# Patient Record
Sex: MALE | Race: WHITE | NOT HISPANIC OR LATINO | Employment: OTHER | URBAN - METROPOLITAN AREA
[De-identification: names, ages, dates, MRNs, and addresses within clinical notes are randomized per-mention and may not be internally consistent; named-entity substitution may affect disease eponyms.]

---

## 2017-04-25 ENCOUNTER — HOSPITAL ENCOUNTER (OUTPATIENT)
Dept: LAB | Facility: MEDICAL CENTER | Age: 62
End: 2017-04-25
Attending: NURSE PRACTITIONER
Payer: COMMERCIAL

## 2017-04-25 LAB
ALBUMIN SERPL BCP-MCNC: 4.5 G/DL (ref 3.2–4.9)
ALBUMIN/GLOB SERPL: 1.6 G/DL
ALP SERPL-CCNC: 77 U/L (ref 30–99)
ALT SERPL-CCNC: 43 U/L (ref 2–50)
ANION GAP SERPL CALC-SCNC: 8 MMOL/L (ref 0–11.9)
AST SERPL-CCNC: 29 U/L (ref 12–45)
BILIRUB SERPL-MCNC: 0.4 MG/DL (ref 0.1–1.5)
BUN SERPL-MCNC: 22 MG/DL (ref 8–22)
CALCIUM SERPL-MCNC: 9.3 MG/DL (ref 8.5–10.5)
CHLORIDE SERPL-SCNC: 106 MMOL/L (ref 96–112)
CHOLEST SERPL-MCNC: 162 MG/DL (ref 100–199)
CO2 SERPL-SCNC: 27 MMOL/L (ref 20–33)
CREAT SERPL-MCNC: 0.97 MG/DL (ref 0.5–1.4)
EST. AVERAGE GLUCOSE BLD GHB EST-MCNC: 114 MG/DL
GFR SERPL CREATININE-BSD FRML MDRD: >60 ML/MIN/1.73 M 2
GLOBULIN SER CALC-MCNC: 2.8 G/DL (ref 1.9–3.5)
GLUCOSE SERPL-MCNC: 121 MG/DL (ref 65–99)
HBA1C MFR BLD: 5.6 % (ref 0–5.6)
HDLC SERPL-MCNC: 32 MG/DL
LDLC SERPL CALC-MCNC: 69 MG/DL
POTASSIUM SERPL-SCNC: 4.4 MMOL/L (ref 3.6–5.5)
PROT SERPL-MCNC: 7.3 G/DL (ref 6–8.2)
SODIUM SERPL-SCNC: 141 MMOL/L (ref 135–145)
TRIGL SERPL-MCNC: 307 MG/DL (ref 0–149)

## 2017-04-25 PROCEDURE — 36415 COLL VENOUS BLD VENIPUNCTURE: CPT

## 2017-04-25 PROCEDURE — 80053 COMPREHEN METABOLIC PANEL: CPT

## 2017-04-25 PROCEDURE — 83036 HEMOGLOBIN GLYCOSYLATED A1C: CPT

## 2017-04-25 PROCEDURE — 80061 LIPID PANEL: CPT

## 2017-05-26 ENCOUNTER — HOSPITAL ENCOUNTER (OUTPATIENT)
Dept: RADIOLOGY | Facility: MEDICAL CENTER | Age: 62
End: 2017-05-26
Attending: ORTHOPAEDIC SURGERY
Payer: COMMERCIAL

## 2017-05-26 DIAGNOSIS — M75.121 COMPLETE TEAR OF RIGHT ROTATOR CUFF: ICD-10-CM

## 2017-05-26 PROCEDURE — 73221 MRI JOINT UPR EXTREM W/O DYE: CPT | Mod: RT

## 2017-06-21 ENCOUNTER — OFFICE VISIT (OUTPATIENT)
Dept: URGENT CARE | Facility: PHYSICIAN GROUP | Age: 62
End: 2017-06-21
Payer: COMMERCIAL

## 2017-06-21 VITALS
TEMPERATURE: 97.2 F | RESPIRATION RATE: 16 BRPM | WEIGHT: 260 LBS | DIASTOLIC BLOOD PRESSURE: 78 MMHG | BODY MASS INDEX: 32.5 KG/M2 | OXYGEN SATURATION: 94 % | HEART RATE: 98 BPM | SYSTOLIC BLOOD PRESSURE: 136 MMHG

## 2017-06-21 DIAGNOSIS — J22 LRTI (LOWER RESPIRATORY TRACT INFECTION): ICD-10-CM

## 2017-06-21 PROCEDURE — 99204 OFFICE O/P NEW MOD 45 MIN: CPT | Performed by: FAMILY MEDICINE

## 2017-06-21 RX ORDER — AZITHROMYCIN 250 MG/1
TABLET, FILM COATED ORAL
Qty: 6 TAB | Refills: 0 | Status: SHIPPED | OUTPATIENT
Start: 2017-06-21 | End: 2018-07-05

## 2017-06-21 ASSESSMENT — ENCOUNTER SYMPTOMS
CHILLS: 1
SWEATS: 1
SHORTNESS OF BREATH: 1
SORE THROAT: 0
DIZZINESS: 0
VOMITING: 0
HEADACHES: 1
WHEEZING: 0
EYE PAIN: 0
RHINORRHEA: 0
MYALGIAS: 1
COUGH: 1
NAUSEA: 0
FEVER: 1

## 2017-06-21 NOTE — PROGRESS NOTES
Subjective:      Kris Mcdonald is a 61 y.o. male who presents with Cough            Cough  This is a new problem. The current episode started in the past 7 days. The problem has been gradually worsening. The problem occurs every few minutes. The cough is productive of sputum. Associated symptoms include chills, a fever, headaches, myalgias, shortness of breath and sweats. Pertinent negatives include no chest pain, nasal congestion, postnasal drip, rash, rhinorrhea, sore throat or wheezing.       Review of Systems   Constitutional: Positive for fever and chills.   HENT: Negative for postnasal drip, rhinorrhea and sore throat.    Eyes: Negative for pain.   Respiratory: Positive for cough and shortness of breath. Negative for wheezing.    Cardiovascular: Negative for chest pain.   Gastrointestinal: Negative for nausea and vomiting.   Genitourinary: Negative for hematuria.   Musculoskeletal: Positive for myalgias.   Skin: Negative for rash.   Neurological: Positive for headaches. Negative for dizziness.     PMH:  has a past medical history of Hypertension; Hypercholesteremia; and Sleep apnea.  MEDS:   Current outpatient prescriptions:   •  ATORVASTATIN CALCIUM PO, Take  by mouth., Disp: , Rfl:   •  azithromycin (ZITHROMAX) 250 MG Tab, Take 2 tablets by mouth on day one. Take one tablet by mouth the remaining days until gone, Disp: 6 Tab, Rfl: 0  •  metoprolol SR (TOPROL XL) 25 MG TB24, Take 25 mg by mouth 2 Times a Day.  , Disp: , Rfl:   •  hydrochlorothiazide (HYDRODIURIL) 25 MG TABS, Take 25 mg by mouth every day., Disp: , Rfl:   •  Ciprofloxacin HCl 0.2 % SOLN, Place 5 Drops in ear 2 times a day., Disp: 1 Each, Rfl: 0  •  hydrocodone-acetaminophen (VICODIN) 5-500 MG TABS, Take 1 Tab by mouth every four hours as needed., Disp: 10 Each, Rfl: 0  •  hydrocodone/acetaminophen (NORCO)  MG TABS, Take 1-2 Tabs by mouth every 6 hours as needed.  , Disp: , Rfl:   ALLERGIES: No Known Allergies  SURGHX:   Past  Surgical History   Procedure Laterality Date   • Shoulder arthroscopy w/ bicipital tenodesis repair  10/1/2012     Performed by Efrain Zamarripa M.D. at SURGERY [facility] [place]   • Rotator cuff repair  10/1/2012     Performed by Efrain Zamarripa M.D. at SUR[facility]ERY [facility] [place]   • Shoulder decompression  10/1/2012     Performed by Efrian Zamarripa M.D. at SUR[facility]ERY [facility] [place]     SOCHX:  reports that he has never smoked. He does not have any smokeless tobacco history on file. He reports that he does not drink alcohol.  FH: family history includes Diabetes in his mother; Heart Disease in his father and mother.      Objective:     /78 mmHg  Pulse 98  Temp(Src) 36.2 °C (97.2 °F)  Resp 16  Wt 117.935 kg (260 lb)  SpO2 94%     Physical Exam   Constitutional: He is oriented to person, place, and time. He appears well-developed and well-nourished. No distress.   HENT:   Head: Normocephalic and atraumatic.   Eyes: Conjunctivae and EOM are normal. Pupils are equal, round, and reactive to light.   Cardiovascular: Normal rate and regular rhythm.    No murmur heard.  Pulmonary/Chest: Effort normal. No respiratory distress. He has wheezes. He has no rales.   Abdominal: Soft. He exhibits no distension. There is no tenderness.   Neurological: He is alert and oriented to person, place, and time. He has normal reflexes. No sensory deficit.   Skin: Skin is warm and dry.   Psychiatric: He has a normal mood and affect.               Assessment/Plan:     1. LRTI (lower respiratory tract infection)  Differential diagnosis, natural history, supportive care, and indications for immediate follow-up discussed.   - azithromycin (ZITHROMAX) 250 MG Tab; Take 2 tablets by mouth on day one. Take one tablet by mouth the remaining days until gone  Dispense: 6 Tab; Refill: 0

## 2017-06-21 NOTE — MR AVS SNAPSHOT
Kris Mcdonald   2017 9:15 AM   Office Visit   MRN: [id number]    Department:  [facility]   Dept Phone:  [phone]    Description:  Male : 1955   Provider:  Timothy Langley M.D.           Reason for Visit     Cough congestion, headache, fever(supposed) x 1 week      Allergies as of 2017     No Known Allergies      You were diagnosed with     Cough   [786.2.ICD-9-CM]         Vital Signs     Blood Pressure Pulse Temperature Respirations Weight Oxygen Saturation    136/78 mmHg 98 36.2 °C (97.2 °F) 16 117.935 kg (260 lb) 94%    Smoking Status                   Never Smoker            Basic Information     Date Of Birth Sex Race Ethnicity Preferred Language    1955 Male White Non- English      Problem List              ICD-10-CM Priority Class Noted - Resolved    Complete rupture of rotator cuff M75.120   10/1/2012 - Present      Health Maintenance        Date Due Completion Dates    IMM DTaP/Tdap/Td Vaccine (1 - Tdap) 1973 ---    COLONOSCOPY 2004 ---    IMM ZOSTER VACCINE 2014 ---            Current Immunizations     Tetanus Vaccine 2010      Below and/or attached are the medications your provider expects you to take. Review all of your home medications and newly ordered medications with your provider and/or pharmacist. Follow medication instructions as directed by your provider and/or pharmacist. Please keep your medication list with you and share with your provider. Update the information when medications are discontinued, doses are changed, or new medications (including over-the-counter products) are added; and carry medication information at all times in the event of emergency situations     Allergies:  No Known Allergies          Medications  Valid as of: 2017 -  9:33 AM    Generic Name Brand Name Tablet Size Instructions for use    Atorvastatin Calcium   Take  by mouth.        Azithromycin (Tab) ZITHROMAX 250 MG Take 2 tablets by  mouth on day one. Take one tablet by mouth the remaining days until gone        Ciprofloxacin HCl (Solution) Ciprofloxacin HCl 0.2 % Place 5 Drops in ear 2 times a day.        HydroCHLOROthiazide (Tab) HYDRODIURIL 25 MG Take 25 mg by mouth every day.        Hydrocodone-Acetaminophen (Tab) NORCO  MG Take 1-2 Tabs by mouth every 6 hours as needed.          Hydrocodone-Acetaminophen (Tab) VICODIN 5-500 MG Take 1 Tab by mouth every four hours as needed.        Metoprolol Succinate (TABLET SR 24 HR) TOPROL XL 25 MG Take 25 mg by mouth 2 Times a Day.          .                 Medicines prescribed today were sent to:     [id number] - [place], [place] - [address]    [address] [place] [address]    Phone: [phone] Fax: [phone]    Open 24 Hours?: No      Medication refill instructions:       If your prescription bottle indicates you have medication refills left, it is not necessary to call your provider’s office. Please contact your pharmacy and they will refill your medication.    If your prescription bottle indicates you do not have any refills left, you may request refills at any time through one of the following ways: The online [employer] system (except Urgent Care), by calling your provider’s office, or by asking your pharmacy to contact your provider’s office with a refill request. Medication refills are processed only during regular business hours and may not be available until the next business day. Your provider may request additional information or to have a follow-up visit with you prior to refilling your medication.   *Please Note: Medication refills are assigned a new Rx number when refilled electronically. Your pharmacy may indicate that no refills were authorized even though a new prescription for the same medication is available at the pharmacy. Please request the medicine by name with the pharmacy before contacting your provider for a refill.        Instructions    Cough,  Adult   A cough is a reflex that helps clear your throat and airways. It can help heal the body or may be a reaction to an irritated airway. A cough may only last 2 or 3 weeks (acute) or may last more than 8 weeks (chronic).   CAUSES  Acute cough:  · Viral or bacterial infections.  Chronic cough:  · Infections.  · Allergies.  · Asthma.  · Post-nasal drip.  · Smoking.  · Heartburn or acid reflux.  · Some medicines.  · Chronic lung problems (COPD).  · Cancer.  SYMPTOMS   · Cough.  · Fever.  · Chest pain.  · Increased breathing rate.  · High-pitched whistling sound when breathing (wheezing).  · Colored mucus that you cough up (sputum).  TREATMENT   · A bacterial cough may be treated with antibiotic medicine.  · A viral cough must run its course and will not respond to antibiotics.  · Your caregiver may recommend other treatments if you have a chronic cough.  HOME CARE INSTRUCTIONS   · Only take over-the-counter or prescription medicines for pain, discomfort, or fever as directed by your caregiver. Use cough suppressants only as directed by your caregiver.  · Use a cold steam vaporizer or humidifier in your bedroom or home to help loosen secretions.  · Sleep in a semi-upright position if your cough is worse at night.  · Rest as needed.  · Stop smoking if you smoke.  SEEK IMMEDIATE MEDICAL CARE IF:   · You have pus in your sputum.  · Your cough starts to worsen.  · You cannot control your cough with suppressants and are losing sleep.  · You begin coughing up blood.  · You have difficulty breathing.  · You develop pain which is getting worse or is uncontrolled with medicine.  · You have a fever.  MAKE SURE YOU:   · Understand these instructions.  · Will watch your condition.  · Will get help right away if you are not doing well or get worse.     This information is not intended to replace advice given to you by your health care provider. Make sure you discuss any questions you have with your health care provider.      Document Released: 06/15/2012 Document Revised: 03/11/2013 Document Reviewed: 02/24/2016  [employer] Interactive Patient Education ©2016 [employer]            [employer] Access Code: Activation code not generated  Current [employer] Status: Active

## 2017-06-21 NOTE — PATIENT INSTRUCTIONS
Cough, Adult   A cough is a reflex that helps clear your throat and airways. It can help heal the body or may be a reaction to an irritated airway. A cough may only last 2 or 3 weeks (acute) or may last more than 8 weeks (chronic).   CAUSES  Acute cough:  · Viral or bacterial infections.  Chronic cough:  · Infections.  · Allergies.  · Asthma.  · Post-nasal drip.  · Smoking.  · Heartburn or acid reflux.  · Some medicines.  · Chronic lung problems (COPD).  · Cancer.  SYMPTOMS   · Cough.  · Fever.  · Chest pain.  · Increased breathing rate.  · High-pitched whistling sound when breathing (wheezing).  · Colored mucus that you cough up (sputum).  TREATMENT   · A bacterial cough may be treated with antibiotic medicine.  · A viral cough must run its course and will not respond to antibiotics.  · Your caregiver may recommend other treatments if you have a chronic cough.  HOME CARE INSTRUCTIONS   · Only take over-the-counter or prescription medicines for pain, discomfort, or fever as directed by your caregiver. Use cough suppressants only as directed by your caregiver.  · Use a cold steam vaporizer or humidifier in your bedroom or home to help loosen secretions.  · Sleep in a semi-upright position if your cough is worse at night.  · Rest as needed.  · Stop smoking if you smoke.  SEEK IMMEDIATE MEDICAL CARE IF:   · You have pus in your sputum.  · Your cough starts to worsen.  · You cannot control your cough with suppressants and are losing sleep.  · You begin coughing up blood.  · You have difficulty breathing.  · You develop pain which is getting worse or is uncontrolled with medicine.  · You have a fever.  MAKE SURE YOU:   · Understand these instructions.  · Will watch your condition.  · Will get help right away if you are not doing well or get worse.     This information is not intended to replace advice given to you by your health care provider. Make sure you discuss any questions you have with your health care provider.      Document Released: 06/15/2012 Document Revised: 03/11/2013 Document Reviewed: 02/24/2016  [employer] Interactive Patient Education ©2016 [employer]

## 2017-09-08 ENCOUNTER — HOSPITAL ENCOUNTER (OUTPATIENT)
Dept: LAB | Facility: MEDICAL CENTER | Age: 62
End: 2017-09-08
Attending: NURSE PRACTITIONER
Payer: COMMERCIAL

## 2017-09-08 LAB — GFR SERPL CREATININE-BSD FRML MDRD: >60 ML/MIN/1.73 M 2

## 2017-09-08 PROCEDURE — 36415 COLL VENOUS BLD VENIPUNCTURE: CPT

## 2017-09-08 PROCEDURE — 80053 COMPREHEN METABOLIC PANEL: CPT

## 2017-09-08 PROCEDURE — 83036 HEMOGLOBIN GLYCOSYLATED A1C: CPT

## 2017-09-08 PROCEDURE — 80061 LIPID PANEL: CPT

## 2017-09-09 LAB
ALBUMIN SERPL BCP-MCNC: 4.3 G/DL (ref 3.2–4.9)
ALBUMIN/GLOB SERPL: 1.6 G/DL
ALP SERPL-CCNC: 61 U/L (ref 30–99)
ALT SERPL-CCNC: 31 U/L (ref 2–50)
ANION GAP SERPL CALC-SCNC: 7 MMOL/L (ref 0–11.9)
AST SERPL-CCNC: 28 U/L (ref 12–45)
BILIRUB SERPL-MCNC: 0.7 MG/DL (ref 0.1–1.5)
BUN SERPL-MCNC: 18 MG/DL (ref 8–22)
CALCIUM SERPL-MCNC: 9.2 MG/DL (ref 8.5–10.5)
CHLORIDE SERPL-SCNC: 105 MMOL/L (ref 96–112)
CHOLEST SERPL-MCNC: 143 MG/DL (ref 100–199)
CO2 SERPL-SCNC: 26 MMOL/L (ref 20–33)
CREAT SERPL-MCNC: 0.92 MG/DL (ref 0.5–1.4)
EST. AVERAGE GLUCOSE BLD GHB EST-MCNC: 126 MG/DL
GLOBULIN SER CALC-MCNC: 2.7 G/DL (ref 1.9–3.5)
GLUCOSE SERPL-MCNC: 107 MG/DL (ref 65–99)
HBA1C MFR BLD: 6 % (ref 0–5.6)
HDLC SERPL-MCNC: 33 MG/DL
LDLC SERPL CALC-MCNC: 76 MG/DL
POTASSIUM SERPL-SCNC: 3.8 MMOL/L (ref 3.6–5.5)
PROT SERPL-MCNC: 7 G/DL (ref 6–8.2)
SODIUM SERPL-SCNC: 138 MMOL/L (ref 135–145)
TRIGL SERPL-MCNC: 172 MG/DL (ref 0–149)

## 2018-02-23 ENCOUNTER — HOSPITAL ENCOUNTER (OUTPATIENT)
Dept: LAB | Facility: MEDICAL CENTER | Age: 63
End: 2018-02-23
Attending: NURSE PRACTITIONER
Payer: COMMERCIAL

## 2018-02-23 LAB
ALBUMIN SERPL BCP-MCNC: 4.2 G/DL (ref 3.2–4.9)
ALBUMIN/GLOB SERPL: 1.5 G/DL
ALP SERPL-CCNC: 58 U/L (ref 30–99)
ALT SERPL-CCNC: 39 U/L (ref 2–50)
ANION GAP SERPL CALC-SCNC: 4 MMOL/L (ref 0–11.9)
AST SERPL-CCNC: 22 U/L (ref 12–45)
BILIRUB SERPL-MCNC: 0.8 MG/DL (ref 0.1–1.5)
BUN SERPL-MCNC: 24 MG/DL (ref 8–22)
CALCIUM SERPL-MCNC: 9.2 MG/DL (ref 8.5–10.5)
CHLORIDE SERPL-SCNC: 105 MMOL/L (ref 96–112)
CHOLEST SERPL-MCNC: 138 MG/DL (ref 100–199)
CO2 SERPL-SCNC: 30 MMOL/L (ref 20–33)
CREAT SERPL-MCNC: 0.97 MG/DL (ref 0.5–1.4)
GLOBULIN SER CALC-MCNC: 2.8 G/DL (ref 1.9–3.5)
GLUCOSE SERPL-MCNC: 113 MG/DL (ref 65–99)
HDLC SERPL-MCNC: 32 MG/DL
LDLC SERPL CALC-MCNC: 65 MG/DL
POTASSIUM SERPL-SCNC: 4.3 MMOL/L (ref 3.6–5.5)
PROT SERPL-MCNC: 7 G/DL (ref 6–8.2)
SODIUM SERPL-SCNC: 139 MMOL/L (ref 135–145)
TRIGL SERPL-MCNC: 207 MG/DL (ref 0–149)

## 2018-02-23 PROCEDURE — 36415 COLL VENOUS BLD VENIPUNCTURE: CPT

## 2018-02-23 PROCEDURE — 80061 LIPID PANEL: CPT

## 2018-02-23 PROCEDURE — 80053 COMPREHEN METABOLIC PANEL: CPT

## 2018-07-05 ENCOUNTER — OFFICE VISIT (OUTPATIENT)
Dept: MEDICAL GROUP | Facility: PHYSICIAN GROUP | Age: 63
End: 2018-07-05
Payer: COMMERCIAL

## 2018-07-05 VITALS
TEMPERATURE: 97.5 F | OXYGEN SATURATION: 97 % | HEIGHT: 75 IN | BODY MASS INDEX: 33.32 KG/M2 | DIASTOLIC BLOOD PRESSURE: 92 MMHG | RESPIRATION RATE: 16 BRPM | WEIGHT: 268 LBS | SYSTOLIC BLOOD PRESSURE: 140 MMHG | HEART RATE: 82 BPM

## 2018-07-05 DIAGNOSIS — Z23 NEED FOR TDAP VACCINATION: ICD-10-CM

## 2018-07-05 DIAGNOSIS — G43.919 INTRACTABLE MIGRAINE WITHOUT STATUS MIGRAINOSUS, UNSPECIFIED MIGRAINE TYPE: ICD-10-CM

## 2018-07-05 DIAGNOSIS — E78.00 HYPERCHOLESTEREMIA: ICD-10-CM

## 2018-07-05 DIAGNOSIS — R73.01 IMPAIRED FASTING GLUCOSE: ICD-10-CM

## 2018-07-05 DIAGNOSIS — I10 ESSENTIAL HYPERTENSION: ICD-10-CM

## 2018-07-05 DIAGNOSIS — M62.838 MUSCLE SPASMS OF BOTH LOWER EXTREMITIES: ICD-10-CM

## 2018-07-05 PROBLEM — G43.909 MIGRAINE: Status: ACTIVE | Noted: 2018-07-05

## 2018-07-05 PROBLEM — G47.30 SLEEP APNEA: Status: ACTIVE | Noted: 2018-07-05

## 2018-07-05 PROCEDURE — 90715 TDAP VACCINE 7 YRS/> IM: CPT | Performed by: INTERNAL MEDICINE

## 2018-07-05 PROCEDURE — 90471 IMMUNIZATION ADMIN: CPT | Performed by: INTERNAL MEDICINE

## 2018-07-05 PROCEDURE — 99204 OFFICE O/P NEW MOD 45 MIN: CPT | Mod: 25 | Performed by: INTERNAL MEDICINE

## 2018-07-05 RX ORDER — ATORVASTATIN CALCIUM 40 MG/1
40 TABLET, FILM COATED ORAL NIGHTLY
COMMUNITY
End: 2019-03-12 | Stop reason: SDUPTHER

## 2018-07-05 RX ORDER — BUTALBITAL, ACETAMINOPHEN, CAFFEINE AND CODEINE PHOSPHATE 300; 50; 40; 30 MG/1; MG/1; MG/1; MG/1
1 CAPSULE ORAL
Qty: 30 CAP | Refills: 0 | Status: SHIPPED | OUTPATIENT
Start: 2018-07-05 | End: 2018-10-03

## 2018-07-05 RX ORDER — METOPROLOL SUCCINATE 100 MG/1
100 TABLET, EXTENDED RELEASE ORAL DAILY
Qty: 90 TAB | Refills: 1 | Status: SHIPPED | OUTPATIENT
Start: 2018-07-05 | End: 2019-02-28 | Stop reason: SDUPTHER

## 2018-07-05 RX ORDER — HYDROCHLOROTHIAZIDE 12.5 MG/1
12.5 TABLET ORAL DAILY
Qty: 90 TAB | Refills: 1 | Status: SHIPPED | OUTPATIENT
Start: 2018-07-05 | End: 2019-02-07 | Stop reason: SDUPTHER

## 2018-07-05 RX ORDER — CYCLOBENZAPRINE HCL 10 MG
10 TABLET ORAL 2 TIMES DAILY PRN
Qty: 60 TAB | Refills: 0 | Status: SHIPPED | OUTPATIENT
Start: 2018-07-05 | End: 2019-03-12 | Stop reason: SDUPTHER

## 2018-07-05 RX ORDER — CYCLOBENZAPRINE HCL 10 MG
10 TABLET ORAL 3 TIMES DAILY PRN
COMMUNITY
End: 2018-07-05 | Stop reason: SDUPTHER

## 2018-07-05 RX ORDER — ATORVASTATIN CALCIUM 40 MG/1
TABLET, FILM COATED ORAL
COMMUNITY
Start: 2018-05-05 | End: 2018-07-05

## 2018-07-05 RX ORDER — METOPROLOL SUCCINATE 100 MG/1
TABLET, EXTENDED RELEASE ORAL
COMMUNITY
Start: 2018-06-27 | End: 2018-07-05 | Stop reason: SDUPTHER

## 2018-07-05 RX ORDER — HYDROCHLOROTHIAZIDE 12.5 MG/1
TABLET ORAL
COMMUNITY
Start: 2018-06-06 | End: 2018-07-05 | Stop reason: SDUPTHER

## 2018-07-05 ASSESSMENT — PATIENT HEALTH QUESTIONNAIRE - PHQ9: CLINICAL INTERPRETATION OF PHQ2 SCORE: 0

## 2018-07-05 NOTE — ASSESSMENT & PLAN NOTE
Controlled on toprol  mg and HCTZ 12.5 mg daily for some time. Denies lightheadedness and dizziness. Normally his SBP is about 15 points lower than it is today in the office.

## 2018-07-05 NOTE — LETTER
[facility]  [address] [place] [address]  Fax: [phone]   Authorization for Release/Disclosure of   Protected Health Information   Name: JUAN JOLLY : 1955 SSN: [id number]   Address: [address] [place] [address] Phone:    [phone] (home)    I authorize the entity listed below to release/disclose the PHI below to:   [facility] and Brayan Pereyra M.D.   Provider or Entity Name:  [facility]   Address   [facility], [facility], [place]            [address] [facility] [address] Phone:  [phone]      Fax:       [phone]          Reason for request: continuity of care   Information to be released:    [ X ] LAST COLONOSCOPY,  including any PATH REPORT and follow-up  [ X ] LAST FIT/COLOGUARD RESULT [  ] LAST DEXA  [  ] LAST MAMMOGRAM  [  ] LAST PAP  [  ] LAST LABS [  ] RETINA EXAM REPORT  [  ] IMMUNIZATION RECORDS  [  ] Release all info      [  ] Check here and initial the line next to each item to release ALL health information INCLUDING  _____ Care and treatment for drug and / or alcohol abuse  _____ HIV testing, infection status, or AIDS  _____ Genetic Testing    DATES OF SERVICE OR TIME PERIOD TO BE DISCLOSED: _____________  I understand and acknowledge that:  * This Authorization may be revoked at any time by you in writing, except if your health information has already been used or disclosed.  * Your health information that will be used or disclosed as a result of you signing this authorization could be re-disclosed by the recipient. If this occurs, your re-disclosed health information may no longer be protected by State or Federal laws.  * You may refuse to sign this Authorization. Your refusal will not affect your ability to obtain treatment.  * This Authorization becomes effective upon signing and will  on (date) __________.      If no date is indicated, this Authorization will  one (1) year from the  signature date.    Name: Kris Mcdonald       Date:     7/5/2018       PLEASE FAX REQUESTED RECORDS BACK TO: [phone]

## 2018-07-05 NOTE — PROGRESS NOTES
PRIMARY CARE CLINIC NEW PATIENT H&P  Chief Complaint   Patient presents with   • Migraine     Med Management    • Hyperlipidemia     Med Management      History of Present Illness     Hypertension  Controlled on toprol  mg and HCTZ 12.5 mg daily for some time. Denies lightheadedness and dizziness. Normally his SBP is about 15 points lower than it is today in the office.     Impaired fasting glucose  Fasting sugar was 113 as of 2/2018 and A1c was 6% as of fall 2017. He does know that he needs to work on his diet.     Sleep apnea  Compliant with CPAP on occasion.     Migraine  Has migraines a couple times a month or if he's stressed at work. He takes Fioricet about 6 times a month. He medicates as soon as he feels something coming on even if it may be a tension headache. His migraines are characterized by photophobia and phonophobia.     Muscle spasms of both lower extremities  Works as a  and uses flexeril occasionally for upper extremity cramps.     Current Outpatient Prescriptions   Medication Sig Dispense Refill   • BUTALBITAL-ASA-CAFF-CODEINE PO Take 30 mg by mouth.     • aspirin EC (ECOTRIN) 81 MG Tablet Delayed Response Take 81 mg by mouth every day.     • atorvastatin (LIPITOR) 40 MG Tab Take 40 mg by mouth every evening.     • Multiple Vitamins-Minerals (MULTIVITAMIN ADULT PO) Take  by mouth.     • hydroCHLOROthiazide (HYDRODIURIL) 12.5 MG tablet Take 1 Tab by mouth every day. 90 Tab 1   • metoprolol SR (TOPROL XL) 100 MG TABLET SR 24 HR Take 1 Tab by mouth every day. 90 Tab 1   • cyclobenzaprine (FLEXERIL) 10 MG Tab Take 1 Tab by mouth 2 times a day as needed. 60 Tab 0   • Butalbital-APAP-Caff-Cod -48-30 MG Cap Take 1 Capsule by mouth 1 time daily as needed for up to 90 days. 30 Cap 0     No current facility-administered medications for this visit.        Past Medical History:   Diagnosis Date   • Hypercholesteremia    • Hypertension    • Impaired fasting glucose 7/5/2018   •  "Sleep apnea     uses cpap     Past Surgical History:   Procedure Laterality Date   • SHOULDER ARTHROSCOPY W/ BICIPITAL TENODESIS REPAIR  10/1/2012    Performed by Efrain Zamarripa M.D. at SURGERY [facility]   • ROTATOR CUFF REPAIR  10/1/2012    Performed by Efrain Zamarripa M.D. at SUR[facility]ERY [facility]   • SHOULDER DECOMPRESSION  10/1/2012    Performed by Efrain Zamarripa M.D. at SURGERY [facility]     Social History   Substance Use Topics   • Smoking status: Never Smoker   • Smokeless tobacco: Never Used   • Alcohol use Yes      Comment: 6 pack a week      Social History     Social History Narrative          Family History   Problem Relation Age of Onset   • Heart Disease Mother    • Diabetes Mother    • Heart Disease Father      pacemaker     Family Status   Relation Status   • Mother Alive   • Father      Allergies: Patient has no known allergies.    ROS  Constitutional: Negative for fatigue/generalized weakness.   HEENT: Negative for  vision changes, hearing changes    Respiratory: Negative for shortness of breath  Cardiovascular: Negative for chest pain, palpitations  Gastrointestinal: Negative for blood in stool, constipation, diarrhea  Genitourinary: Negative for dysuria, polyuria  Musculoskeletal: Negative for myalgias, back pain, and joint pain.   Skin: Negative for rash  Neurological: Negative for numbness, tingling  Psychiatric/Behavioral: Negative for depression, anxiety       Objective   Blood pressure 140/92, pulse 82, temperature 36.4 °C (97.5 °F), resp. rate 16, height 1.905 m (6' 3\"), weight 121.6 kg (268 lb), SpO2 97 %. Body mass index is 33.5 kg/m².    General: Alert, oriented. In no acute distress   HEET: EOMI, PERRL, conjunctiva non-injected, sclera non-icteric.  Nares patent with no significant congestion or drainage.  Emmie pinnae, external auditory canals, TM pearly gray with normal light reflex bilaterally.Oral mucous membranes pink and moist with " no lesions.  Neck: supple with no cervical, subclavicular lymphadenopathy, JVD, palpable thyroid nodules   Lungs: clear to auscultation bilaterally with good excursion.  CV: regular rate and rhythm.  Abdomen soft, non-distended, non-tender with normal bowel sounds. No hepatosplenomegaly, no masses palpated  Skin: no lesions. Warm, dry   Psychiatric: appropriate mood and affect       Assessment and Plan   The following treatment plan was discussed     1. Hypercholesteremia  Lab Results   Component Value Date/Time    CHOLSTRLTOT 138 02/23/2018 08:32 AM    LDL 65 02/23/2018 08:32 AM    HDL 32 (A) 02/23/2018 08:32 AM    TRIGLYCERIDE 207 (H) 02/23/2018 08:32 AM     Continue atorvastatin, discussed dietary modifications.     2. Impaired fasting glucose  Fasting sugar was 113 as of 2/2018, discussed dietary modifications as above.     3. Need for Tdap vaccination  Given today.   - TDAP VACCINE =>6YO IM    4. Muscle spasms of both lower extremities  - cyclobenzaprine (FLEXERIL) 10 MG Tab; Take 1 Tab by mouth 2 times a day as needed.  Dispense: 60 Tab; Refill: 0    5. Intractable migraine without status migrainosus, unspecified migraine type  Discussed judicious use and given prescription for 3 months and explained that refills of a controlled substance require 3 month follow ups. Opiate risk score 0.   - Butalbital-APAP-Caff-Cod -09-30 MG Cap; Take 1 Capsule by mouth 1 time daily as needed for up to 90 days.  Dispense: 30 Cap; Refill: 0  - CONSENT FOR OPIATE PRESCRIPTION    6. Essential hypertension  Reports SBP 15 points lower at home, which would put his home blood pressures at goal. Refills provided.   - hydroCHLOROthiazide (HYDRODIURIL) 12.5 MG tablet; Take 1 Tab by mouth every day.  Dispense: 90 Tab; Refill: 1  - metoprolol SR (TOPROL XL) 100 MG TABLET SR 24 HR; Take 1 Tab by mouth every day.  Dispense: 90 Tab; Refill: 1      Return in about 6 months (around 1/5/2019).    Health Maintenance      Health  Maintenance Due   Topic Date Due   • COLONOSCOPY  07/07/2004     Colonoscopy records requested     Brayan Pereyra MD  Internal Medicine  [facility]

## 2018-07-05 NOTE — ASSESSMENT & PLAN NOTE
Fasting sugar was 113 as of 2/2018 and A1c was 6% as of fall 2017. He does know that he needs to work on his diet.

## 2018-07-05 NOTE — ASSESSMENT & PLAN NOTE
Has migraines a couple times a month or if he's stressed at work. He takes Fioricet about 6 times a month. He medicates as soon as he feels something coming on even if it may be a tension headache. His migraines are characterized by photophobia and phonophobia.

## 2018-12-12 DIAGNOSIS — Z79.899 CONTROLLED SUBSTANCE AGREEMENT SIGNED: ICD-10-CM

## 2018-12-12 DIAGNOSIS — G43.909 MIGRAINE WITHOUT STATUS MIGRAINOSUS, NOT INTRACTABLE, UNSPECIFIED MIGRAINE TYPE: ICD-10-CM

## 2018-12-12 RX ORDER — BUTALBITAL, ACETAMINOPHEN, CAFFEINE AND CODEINE PHOSPHATE 50; 325; 40; 30 MG/1; MG/1; MG/1; MG/1
1 CAPSULE ORAL
Qty: 30 CAP | Refills: 0
Start: 2018-12-12 | End: 2019-01-11

## 2018-12-12 NOTE — TELEPHONE ENCOUNTER
Please let him know that he does need to complete the controlled substance policy contract on this medication since this is his first prescription through this office. Once signed we can given the refill

## 2018-12-13 NOTE — TELEPHONE ENCOUNTER
Phone Number Called: [phone]    Message:Pt. Stated he will be in to sign controlled substance contract on 12/13/18    Left Message for patient to call back: no

## 2018-12-14 RX ORDER — BUTALBITAL, ACETAMINOPHEN, CAFFEINE AND CODEINE PHOSPHATE 300; 50; 40; 30 MG/1; MG/1; MG/1; MG/1
1 CAPSULE ORAL EVERY 6 HOURS PRN
Qty: 30 CAP | Refills: 0 | Status: SHIPPED | OUTPATIENT
Start: 2018-12-14 | End: 2019-01-13

## 2018-12-14 NOTE — TELEPHONE ENCOUNTER
Renewed medication with new controlled substance agreement.  May consider Fioricet without codeine or triptan.

## 2019-02-07 ENCOUNTER — TELEPHONE (OUTPATIENT)
Dept: MEDICAL GROUP | Facility: PHYSICIAN GROUP | Age: 64
End: 2019-02-07

## 2019-02-07 DIAGNOSIS — E78.00 HYPERCHOLESTEREMIA: ICD-10-CM

## 2019-02-07 DIAGNOSIS — I10 ESSENTIAL HYPERTENSION: ICD-10-CM

## 2019-02-07 DIAGNOSIS — R73.01 IMPAIRED FASTING GLUCOSE: ICD-10-CM

## 2019-02-08 NOTE — TELEPHONE ENCOUNTER
Was the patient seen in the last year in this department? Yes    Does patient have an active prescription for medications requested? No     Received Request Via: Pharmacy      Pt met protocol?: Yes    OV 7/18     BP Readings from Last 1 Encounters:   07/05/18 140/92

## 2019-02-09 RX ORDER — HYDROCHLOROTHIAZIDE 12.5 MG/1
12.5 TABLET ORAL DAILY
Qty: 90 TAB | Refills: 0 | Status: SHIPPED | OUTPATIENT
Start: 2019-02-09 | End: 2019-03-12 | Stop reason: SDUPTHER

## 2019-02-11 ENCOUNTER — TELEPHONE (OUTPATIENT)
Dept: MEDICAL GROUP | Facility: PHYSICIAN GROUP | Age: 64
End: 2019-02-11

## 2019-02-11 NOTE — TELEPHONE ENCOUNTER
Let pt know he was due for appt and labs were in chart. Pt will call back when he knows work schedule.

## 2019-03-07 ENCOUNTER — HOSPITAL ENCOUNTER (OUTPATIENT)
Dept: LAB | Facility: MEDICAL CENTER | Age: 64
End: 2019-03-07
Attending: INTERNAL MEDICINE
Payer: COMMERCIAL

## 2019-03-07 DIAGNOSIS — E78.00 HYPERCHOLESTEREMIA: ICD-10-CM

## 2019-03-07 DIAGNOSIS — R73.01 IMPAIRED FASTING GLUCOSE: ICD-10-CM

## 2019-03-07 DIAGNOSIS — I10 ESSENTIAL HYPERTENSION: ICD-10-CM

## 2019-03-07 LAB
ALBUMIN SERPL BCP-MCNC: 4.4 G/DL (ref 3.2–4.9)
ALBUMIN/GLOB SERPL: 1.8 G/DL
ALP SERPL-CCNC: 67 U/L (ref 30–99)
ALT SERPL-CCNC: 35 U/L (ref 2–50)
ANION GAP SERPL CALC-SCNC: 7 MMOL/L (ref 0–11.9)
AST SERPL-CCNC: 31 U/L (ref 12–45)
BASOPHILS # BLD AUTO: 0.9 % (ref 0–1.8)
BASOPHILS # BLD: 0.06 K/UL (ref 0–0.12)
BILIRUB SERPL-MCNC: 0.8 MG/DL (ref 0.1–1.5)
BUN SERPL-MCNC: 20 MG/DL (ref 8–22)
CALCIUM SERPL-MCNC: 9.3 MG/DL (ref 8.5–10.5)
CHLORIDE SERPL-SCNC: 105 MMOL/L (ref 96–112)
CHOLEST SERPL-MCNC: 154 MG/DL (ref 100–199)
CO2 SERPL-SCNC: 27 MMOL/L (ref 20–33)
CREAT SERPL-MCNC: 0.85 MG/DL (ref 0.5–1.4)
EOSINOPHIL # BLD AUTO: 0.32 K/UL (ref 0–0.51)
EOSINOPHIL NFR BLD: 4.8 % (ref 0–6.9)
ERYTHROCYTE [DISTWIDTH] IN BLOOD BY AUTOMATED COUNT: 43.4 FL (ref 35.9–50)
EST. AVERAGE GLUCOSE BLD GHB EST-MCNC: 120 MG/DL
FASTING STATUS PATIENT QL REPORTED: NORMAL
GLOBULIN SER CALC-MCNC: 2.4 G/DL (ref 1.9–3.5)
GLUCOSE SERPL-MCNC: 118 MG/DL (ref 65–99)
HBA1C MFR BLD: 5.8 % (ref 0–5.6)
HCT VFR BLD AUTO: 49.3 % (ref 42–52)
HDLC SERPL-MCNC: 31 MG/DL
HGB BLD-MCNC: 16.5 G/DL (ref 14–18)
IMM GRANULOCYTES # BLD AUTO: 0.01 K/UL (ref 0–0.11)
IMM GRANULOCYTES NFR BLD AUTO: 0.2 % (ref 0–0.9)
LDLC SERPL CALC-MCNC: 58 MG/DL
LYMPHOCYTES # BLD AUTO: 2.46 K/UL (ref 1–4.8)
LYMPHOCYTES NFR BLD: 37.2 % (ref 22–41)
MCH RBC QN AUTO: 31.9 PG (ref 27–33)
MCHC RBC AUTO-ENTMCNC: 33.5 G/DL (ref 33.7–35.3)
MCV RBC AUTO: 95.2 FL (ref 81.4–97.8)
MONOCYTES # BLD AUTO: 0.62 K/UL (ref 0–0.85)
MONOCYTES NFR BLD AUTO: 9.4 % (ref 0–13.4)
NEUTROPHILS # BLD AUTO: 3.15 K/UL (ref 1.82–7.42)
NEUTROPHILS NFR BLD: 47.5 % (ref 44–72)
NRBC # BLD AUTO: 0 K/UL
NRBC BLD-RTO: 0 /100 WBC
PLATELET # BLD AUTO: 194 K/UL (ref 164–446)
PMV BLD AUTO: 10.1 FL (ref 9–12.9)
POTASSIUM SERPL-SCNC: 4 MMOL/L (ref 3.6–5.5)
PROT SERPL-MCNC: 6.8 G/DL (ref 6–8.2)
RBC # BLD AUTO: 5.18 M/UL (ref 4.7–6.1)
SODIUM SERPL-SCNC: 139 MMOL/L (ref 135–145)
TRIGL SERPL-MCNC: 323 MG/DL (ref 0–149)
WBC # BLD AUTO: 6.6 K/UL (ref 4.8–10.8)

## 2019-03-07 PROCEDURE — 36415 COLL VENOUS BLD VENIPUNCTURE: CPT

## 2019-03-07 PROCEDURE — 83036 HEMOGLOBIN GLYCOSYLATED A1C: CPT

## 2019-03-07 PROCEDURE — 85025 COMPLETE CBC W/AUTO DIFF WBC: CPT

## 2019-03-07 PROCEDURE — 80053 COMPREHEN METABOLIC PANEL: CPT

## 2019-03-07 PROCEDURE — 80061 LIPID PANEL: CPT

## 2019-03-12 ENCOUNTER — OFFICE VISIT (OUTPATIENT)
Dept: MEDICAL GROUP | Facility: PHYSICIAN GROUP | Age: 64
End: 2019-03-12
Payer: COMMERCIAL

## 2019-03-12 VITALS
RESPIRATION RATE: 16 BRPM | SYSTOLIC BLOOD PRESSURE: 132 MMHG | HEART RATE: 78 BPM | HEIGHT: 75 IN | WEIGHT: 272 LBS | OXYGEN SATURATION: 98 % | DIASTOLIC BLOOD PRESSURE: 80 MMHG | TEMPERATURE: 97.8 F | BODY MASS INDEX: 33.82 KG/M2

## 2019-03-12 DIAGNOSIS — R04.0 EPISTAXIS: ICD-10-CM

## 2019-03-12 DIAGNOSIS — G43.919 INTRACTABLE MIGRAINE WITHOUT STATUS MIGRAINOSUS, UNSPECIFIED MIGRAINE TYPE: ICD-10-CM

## 2019-03-12 DIAGNOSIS — M62.838 MUSCLE SPASMS OF BOTH LOWER EXTREMITIES: ICD-10-CM

## 2019-03-12 DIAGNOSIS — I10 ESSENTIAL HYPERTENSION: ICD-10-CM

## 2019-03-12 DIAGNOSIS — E78.00 HYPERCHOLESTEREMIA: ICD-10-CM

## 2019-03-12 PROCEDURE — 99213 OFFICE O/P EST LOW 20 MIN: CPT | Performed by: INTERNAL MEDICINE

## 2019-03-12 RX ORDER — CODEINE/BUTALBITAL/ASA/CAFFEIN 30-50-325
1 CAPSULE ORAL
Qty: 90 CAP | Refills: 0 | Status: SHIPPED | OUTPATIENT
Start: 2019-03-12 | End: 2020-07-15 | Stop reason: SDUPTHER

## 2019-03-12 RX ORDER — HYDROCHLOROTHIAZIDE 12.5 MG/1
12.5 TABLET ORAL DAILY
Qty: 90 TAB | Refills: 3 | Status: SHIPPED | OUTPATIENT
Start: 2019-03-12 | End: 2020-04-14

## 2019-03-12 RX ORDER — CYCLOBENZAPRINE HCL 10 MG
10 TABLET ORAL 2 TIMES DAILY PRN
Qty: 90 TAB | Refills: 1 | Status: SHIPPED | OUTPATIENT
Start: 2019-03-12 | End: 2020-07-15 | Stop reason: SDUPTHER

## 2019-03-12 RX ORDER — ATORVASTATIN CALCIUM 40 MG/1
40 TABLET, FILM COATED ORAL DAILY
Qty: 90 TAB | Refills: 3 | Status: SHIPPED | OUTPATIENT
Start: 2019-03-12 | End: 2020-02-25

## 2019-03-12 RX ORDER — METOPROLOL SUCCINATE 100 MG/1
100 TABLET, EXTENDED RELEASE ORAL DAILY
Qty: 90 TAB | Refills: 3 | Status: SHIPPED | OUTPATIENT
Start: 2019-03-12 | End: 2020-05-05

## 2019-03-12 ASSESSMENT — PATIENT HEALTH QUESTIONNAIRE - PHQ9: CLINICAL INTERPRETATION OF PHQ2 SCORE: 0

## 2019-03-13 NOTE — ASSESSMENT & PLAN NOTE
He has been having some intermittent episodes of epistaxis for the past few weeks of his left nare. He used a q-tip with petroleum jelly which seemed to help.

## 2019-03-13 NOTE — PROGRESS NOTES
PRIMARY CARE CLINIC FOLLOW UP VISIT  Chief Complaint   Patient presents with   • Epistaxis     History of Present Illness     Epistaxis  He has been having some intermittent episodes of epistaxis for the past few weeks of his left nare. He used a q-tip with petroleum jelly which seemed to help.     Current Outpatient Prescriptions   Medication Sig Dispense Refill   • atorvastatin (LIPITOR) 40 MG Tab Take 1 Tab by mouth every day. 90 Tab 3   • metoprolol SR (TOPROL XL) 100 MG TABLET SR 24 HR Take 1 Tab by mouth every day. 90 Tab 3   • hydroCHLOROthiazide (HYDRODIURIL) 12.5 MG tablet Take 1 Tab by mouth every day. 90 Tab 3   • butalbital-aspirin-caffeine-codeine (FIORINAL WITH CODEINE) -37-30 MG per capsule Take 1 Cap by mouth 1 time daily as needed for up to 90 days. 90 Cap 0   • cyclobenzaprine (FLEXERIL) 10 MG Tab Take 1 Tab by mouth 2 times a day as needed. 90 Tab 1   • aspirin EC (ECOTRIN) 81 MG Tablet Delayed Response Take 81 mg by mouth every day.     • Multiple Vitamins-Minerals (MULTIVITAMIN ADULT PO) Take  by mouth.       No current facility-administered medications for this visit.      Past Medical History:   Diagnosis Date   • Hypercholesteremia    • Hypertension    • Impaired fasting glucose 7/5/2018   • Sleep apnea     uses cpap     Past Surgical History:   Procedure Laterality Date   • SHOULDER ARTHROSCOPY W/ BICIPITAL TENODESIS REPAIR  10/1/2012    Performed by Efrain Zamarripa M.D. at SURGERY [facility]   • ROTATOR CUFF REPAIR  10/1/2012    Performed by Efrain Zamarripa M.D. at SURGERY [facility]   • SHOULDER DECOMPRESSION  10/1/2012    Performed by Efrain Zamarripa M.D. at SURGERY [facility]     Social History   Substance Use Topics   • Smoking status: Never Smoker   • Smokeless tobacco: Never Used   • Alcohol use Yes      Comment: 6 pack a week      Social History     Social History Narrative          Family History   Problem Relation Age of Onset   • Heart  "Disease Mother    • Diabetes Mother    • Heart Disease Father         pacemaker     Family Status   Relation Status   • Mo Alive   • Fa      Allergies: Patient has no known allergies.    ROS  As per HPI above. All other systems reviewed and negative.        Objective   Blood pressure 132/80, pulse 78, temperature 36.6 °C (97.8 °F), resp. rate 16, height 1.905 m (6' 3\"), weight 123.4 kg (272 lb), SpO2 98 %. Body mass index is 34 kg/m².    General: alert and oriented, pleasant, cooperative  HEENT: Normocephalic, atraumatic. Erythema of medial aspect of septum visualized through left nare but no active oozing   Cardiovascular: regular rate and rhythm, normal S1/S2  Pulmonary: lungs clear to auscultation bilaterally  Skin: warm and dry, no lesions or rashes  Psychiatric: appropriate mood and affect. Good insight and appropriate judgment       Assessment and Plan   The following treatment plan was discussed     1. Hypercholesteremia  Given refill of his atorvastatin.     2. Essential hypertension  - metoprolol SR (TOPROL XL) 100 MG TABLET SR 24 HR; Take 1 Tab by mouth every day.  Dispense: 90 Tab; Refill: 3    3. Muscle spasms of both lower extremities  - cyclobenzaprine (FLEXERIL) 10 MG Tab; Take 1 Tab by mouth 2 times a day as needed.  Dispense: 90 Tab; Refill: 1    4. Intractable migraine without status migrainosus, unspecified migraine type  - butalbital-aspirin-caffeine-codeine (FIORINAL WITH CODEINE) -80-30 MG per capsule; Take 1 Cap by mouth 1 time daily as needed for up to 90 days.  Dispense: 90 Cap; Refill: 0  - Consent for Opiate Prescription    5. Epistaxis  No active oozing for cautery today. Advised continuing petroleum jelly, may use humidifier and nasal saline spray to help keep area moisturized as it's likely bleeding from dryness.       Healthcare maintenance     Health Maintenance Due   Topic Date Due   • IMM INFLUENZA (1) 2018       Return in about 1 year (around 3/12/2020).    Brayan " Hafsa MOORE  Internal Medicine  [facility]

## 2020-04-13 NOTE — TELEPHONE ENCOUNTER
Was the patient seen in the last year in this department? No     Does patient have an active prescription for medications requested? No     Received Request Via: Pharmacy      Pt met protocol?: No   Pt last ov 3/2019 has no upcoming appt   BP Readings from Last 1 Encounters:   03/12/19 132/80

## 2020-04-14 ENCOUNTER — PATIENT MESSAGE (OUTPATIENT)
Dept: MEDICAL GROUP | Facility: PHYSICIAN GROUP | Age: 65
End: 2020-04-14

## 2020-04-14 RX ORDER — HYDROCHLOROTHIAZIDE 12.5 MG/1
TABLET ORAL
Qty: 90 TAB | Refills: 0 | Status: SHIPPED | OUTPATIENT
Start: 2020-04-14 | End: 2021-08-26

## 2020-05-03 DIAGNOSIS — I10 ESSENTIAL HYPERTENSION: ICD-10-CM

## 2020-05-05 RX ORDER — METOPROLOL SUCCINATE 100 MG/1
TABLET, EXTENDED RELEASE ORAL
Qty: 90 TAB | Refills: 0 | Status: SHIPPED | OUTPATIENT
Start: 2020-05-05 | End: 2020-10-26 | Stop reason: SDUPTHER

## 2020-05-05 NOTE — TELEPHONE ENCOUNTER
*PT NEEDS TO ESTABLISH WITH NEW PCP AND MAKE AN APPT AND GET LABS DONE*  Was the patient seen in the last year in this department? No     Does patient have an active prescription for medications requested? No     Received Request Via: Pharmacy      Pt met protocol?: NO    LAST OV 03/12/2019    BP Readings from Last 1 Encounters:   03/12/19 132/80     Lab Results   Component Value Date/Time    CHOLSTRLTOT 154 03/07/2019 07:21 AM    LDL 58 03/07/2019 07:21 AM    HDL 31 (A) 03/07/2019 07:21 AM    TRIGLYCERIDE 323 (H) 03/07/2019 07:21 AM       Lab Results   Component Value Date/Time    SODIUM 139 03/07/2019 07:21 AM    POTASSIUM 4.0 03/07/2019 07:21 AM    CHLORIDE 105 03/07/2019 07:21 AM    CO2 27 03/07/2019 07:21 AM    GLUCOSE 118 (H) 03/07/2019 07:21 AM    BUN 20 03/07/2019 07:21 AM    CREATININE 0.85 03/07/2019 07:21 AM     Lab Results   Component Value Date/Time    ALKPHOSPHAT 67 03/07/2019 07:21 AM    ASTSGOT 31 03/07/2019 07:21 AM    ALTSGPT 35 03/07/2019 07:21 AM    TBILIRUBIN 0.8 03/07/2019 07:21 AM

## 2020-05-27 RX ORDER — ATORVASTATIN CALCIUM 40 MG/1
40 TABLET, FILM COATED ORAL DAILY
Qty: 90 TAB | Refills: 0 | Status: SHIPPED | OUTPATIENT
Start: 2020-05-27 | End: 2020-08-25

## 2020-07-15 ENCOUNTER — OFFICE VISIT (OUTPATIENT)
Dept: MEDICAL GROUP | Facility: PHYSICIAN GROUP | Age: 65
End: 2020-07-15
Payer: COMMERCIAL

## 2020-07-15 VITALS
OXYGEN SATURATION: 94 % | DIASTOLIC BLOOD PRESSURE: 70 MMHG | HEIGHT: 75 IN | RESPIRATION RATE: 18 BRPM | BODY MASS INDEX: 28.1 KG/M2 | TEMPERATURE: 97.6 F | HEART RATE: 71 BPM | WEIGHT: 226 LBS | SYSTOLIC BLOOD PRESSURE: 124 MMHG

## 2020-07-15 DIAGNOSIS — E78.00 HYPERCHOLESTEREMIA: ICD-10-CM

## 2020-07-15 DIAGNOSIS — G43.919 INTRACTABLE MIGRAINE WITHOUT STATUS MIGRAINOSUS, UNSPECIFIED MIGRAINE TYPE: ICD-10-CM

## 2020-07-15 DIAGNOSIS — M62.838 MUSCLE SPASMS OF BOTH LOWER EXTREMITIES: ICD-10-CM

## 2020-07-15 DIAGNOSIS — I10 ESSENTIAL HYPERTENSION: ICD-10-CM

## 2020-07-15 DIAGNOSIS — Z76.89 ENCOUNTER TO ESTABLISH CARE: ICD-10-CM

## 2020-07-15 DIAGNOSIS — Z11.59 NEED FOR HEPATITIS C SCREENING TEST: ICD-10-CM

## 2020-07-15 DIAGNOSIS — G47.30 SLEEP APNEA, UNSPECIFIED TYPE: ICD-10-CM

## 2020-07-15 DIAGNOSIS — Z23 NEED FOR VACCINATION: ICD-10-CM

## 2020-07-15 PROCEDURE — 99214 OFFICE O/P EST MOD 30 MIN: CPT | Mod: 25 | Performed by: NURSE PRACTITIONER

## 2020-07-15 PROCEDURE — 90471 IMMUNIZATION ADMIN: CPT | Performed by: NURSE PRACTITIONER

## 2020-07-15 PROCEDURE — 90670 PCV13 VACCINE IM: CPT | Performed by: NURSE PRACTITIONER

## 2020-07-15 RX ORDER — CYCLOBENZAPRINE HCL 10 MG
10 TABLET ORAL 2 TIMES DAILY PRN
Qty: 90 TAB | Refills: 1 | Status: SHIPPED | OUTPATIENT
Start: 2020-07-15 | End: 2021-08-26 | Stop reason: SDUPTHER

## 2020-07-15 RX ORDER — CODEINE/BUTALBITAL/ASA/CAFFEIN 30-50-325
1 CAPSULE ORAL
Qty: 90 CAP | Refills: 0 | Status: SHIPPED | OUTPATIENT
Start: 2020-07-15 | End: 2020-10-26

## 2020-07-15 SDOH — HEALTH STABILITY: MENTAL HEALTH: HOW OFTEN DO YOU HAVE A DRINK CONTAINING ALCOHOL?: 2-3 TIMES A WEEK

## 2020-07-15 SDOH — HEALTH STABILITY: MENTAL HEALTH: HOW MANY STANDARD DRINKS CONTAINING ALCOHOL DO YOU HAVE ON A TYPICAL DAY?: 1 OR 2

## 2020-07-15 ASSESSMENT — PATIENT HEALTH QUESTIONNAIRE - PHQ9: CLINICAL INTERPRETATION OF PHQ2 SCORE: 0

## 2020-07-15 ASSESSMENT — FIBROSIS 4 INDEX: FIB4 SCORE: 1.78

## 2020-07-15 NOTE — ASSESSMENT & PLAN NOTE
Chronic medical problem. He is taking Fiornal. He takes about once a month. He would like a medication refill.

## 2020-07-15 NOTE — ASSESSMENT & PLAN NOTE
Chronic medical problem. He is taking HCTZ 12.5 mg daily and metoprolol  mg daily. He does not check his blood pressure at home. Denies chest pain, shortness of breath, palpations, dizziness or headaches. His BP is at goal today.

## 2020-07-15 NOTE — ASSESSMENT & PLAN NOTE
Chronic medical problem. He is taking cyclobenzaprine 10 mg BID PRN. He will take every 2 weeks as needed. He would like a medication refill.

## 2020-07-15 NOTE — ASSESSMENT & PLAN NOTE
Chronic medical problem. He did wear a CPAP in past but stopped. He does wake up rested in the morning.

## 2020-07-15 NOTE — ASSESSMENT & PLAN NOTE
Chronic medical problem. He is taking atorvastatin 40 mg every evening. Denies myalgias. Last lab results:  Results for JUAN JOLLY (MRN [id number]) as of 7/15/2020 08:24   Ref. Range 3/7/2019 07:21   Cholesterol,Tot Latest Ref Range: 100 - 199 mg/dL 154   Triglycerides Latest Ref Range: 0 - 149 mg/dL 323 (H)   HDL Latest Ref Range: >=40 mg/dL 31 (A)   LDL Latest Ref Range: <100 mg/dL 58

## 2020-07-15 NOTE — PROGRESS NOTES
Subjective:     CC:  Diagnoses of Encounter to establish care, Hypercholesteremia, Intractable migraine without status migrainosus, unspecified migraine type, Essential hypertension, Muscle spasms of both lower extremities, Sleep apnea, unspecified type, Need for hepatitis C screening test, and Need for vaccination were pertinent to this visit.    HISTORY OF THE PRESENT ILLNESS: Patient is a 65 y.o. male. This pleasant patient is here today to establish care and discuss the following. His prior PCP was Dr. Pereyra.    Sleep apnea  Chronic medical problem. He did wear a CPAP in past but stopped. He does wake up rested in the morning.     Hypertension  Chronic medical problem. He is taking HCTZ 12.5 mg daily and metoprolol  mg daily. He does not check his blood pressure at home. Denies chest pain, shortness of breath, palpations, dizziness or headaches. His BP is at goal today.     Hypercholesteremia  Chronic medical problem. He is taking atorvastatin 40 mg every evening. Denies myalgias. Last lab results:  Results for JUAN JOLLY (MRN [id number]) as of 7/15/2020 08:24   Ref. Range 3/7/2019 07:21   Cholesterol,Tot Latest Ref Range: 100 - 199 mg/dL 154   Triglycerides Latest Ref Range: 0 - 149 mg/dL 323 (H)   HDL Latest Ref Range: >=40 mg/dL 31 (A)   LDL Latest Ref Range: <100 mg/dL 58       Migraine  Chronic medical problem. He is taking Fiornal. He takes about once a month. He would like a medication refill.     Muscle spasms of both lower extremities  Chronic medical problem. He is taking cyclobenzaprine 10 mg BID PRN. He will take every 2 weeks as needed. He would like a medication refill.       Allergies: Patient has no known allergies.    Current Outpatient Medications Ordered in Epic   Medication Sig Dispense Refill   • cyclobenzaprine (FLEXERIL) 10 MG Tab Take 1 Tab by mouth 2 times a day as needed for Muscle Spasms. 90 Tab 1   • butalbital-aspirin-caffeine-codeine (FIORINAL WITH CODEINE) -14-30  MG per capsule Take 1 Cap by mouth 1 time daily as needed for Headache for up to 90 days. 90 Cap 0   • atorvastatin (LIPITOR) 40 MG Tab Take 1 Tab by mouth every day. 90 Tab 0   • metoprolol SR (TOPROL XL) 100 MG TABLET SR 24 HR TAKE 1 TABLET DAILY 90 Tab 0   • hydroCHLOROthiazide (HYDRODIURIL) 12.5 MG tablet TAKE 1 TABLET DAILY 90 Tab 0   • aspirin EC (ECOTRIN) 81 MG Tablet Delayed Response Take 81 mg by mouth every day.     • Multiple Vitamins-Minerals (MULTIVITAMIN ADULT PO) Take  by mouth.       No current Epic-ordered facility-administered medications on file.        Past Medical History:   Diagnosis Date   • Hypercholesteremia    • Hypertension    • Impaired fasting glucose 7/5/2018   • Migraine    • Sleep apnea     uses cpap       Past Surgical History:   Procedure Laterality Date   • SHOULDER ARTHROSCOPY W/ BICIPITAL TENODESIS REPAIR  10/1/2012    Performed by Efrain Zamarripa M.D. at SURGERY [facility]   • ROTATOR CUFF REPAIR  10/1/2012    Performed by Efrain Zamarripa M.D. at SURGERY [facility]   • SHOULDER DECOMPRESSION  10/1/2012    Performed by Efrain Zamarripa M.D. at SURGERY [facility]       Social History     Tobacco Use   • Smoking status: Never Smoker   • Smokeless tobacco: Never Used   Substance Use Topics   • Alcohol use: Yes     Alcohol/week: 1.8 oz     Types: 3 Cans of beer per week     Frequency: 2-3 times a week     Drinks per session: 1 or 2     Comment: 6 pack a week    • Drug use: No       Social History     Social History Narrative            Family History   Problem Relation Age of Onset   • Heart Disease Mother    • Diabetes Mother    • Heart Disease Father         pacemaker       Health Maintenance: due for hepatitis C screen. Due for zoster vaccine and pneumonia vaccine    ROS:   Gen: no fevers/chills, no changes in weight  Eyes: no changes in vision  ENT: no sore throat, no ear pain  Pulm: no sob, no cough  CV: no chest pain, no palpitations  GI:  "no nausea/vomiting, no diarrhea  : no dysuria  MSk: no myalgias  Skin: no rash  Neuro: no headaches, no dizziness      Objective:     Vital signs reviewed  Exam: /70 (BP Location: Left arm, Patient Position: Sitting, BP Cuff Size: Adult)   Pulse 71   Temp 36.4 °C (97.6 °F) (Temporal)   Resp 18   Ht 1.905 m (6' 3\")   Wt 102.5 kg (226 lb)   SpO2 94%  Body mass index is 28.25 kg/m².    General: Normal appearing. No distress.  HENT: Normocephalic. Ears normal shape and contour, canals are clear bilaterally, tympanic membranes are benign.   Eyes: Eyes conjunctiva clear lids without ptosis, pupils equal and reactive to light accommodation, lids normal.  Neck: Supple without JVD. Thyroid is not enlarged.  Pulmonary: Clear to ausculation.  Normal effort. No rales, ronchi, or wheezing.  Cardiovascular: Regular rate and rhythm without murmur. Radial pulses are intact and equal bilaterally.  Abdomen: Soft, nontender, nondistended. Normal bowel sounds. Liver and spleen are not palpable.  Neurologic: Grossly nonfocal  Lymph: No cervical or supraclavicular lymph nodes are palpable  Skin: Warm and dry.  No obvious lesions.  Musculoskeletal: Normal gait. No extremity cyanosis, clubbing, or edema.  Psych: Normal mood and affect. Alert and oriented x3. Judgment and insight is normal.    Assessment & Plan:   65 y.o. male with the following -    1. Encounter to establish care  New problem to examiner. Care established. Labs from 3/7/2018 reviewed.    2. Hypercholesteremia  New problem to examiner.  Continue atorvastatin.  Due for labs.  Monitor and follow-up via [employer].  - Comp Metabolic Panel; Future  - Lipid Profile; Future    3. Intractable migraine without status migrainosus, unspecified migraine type  New problem to examiner.  Continue Fiorinal.  Medication refilled.  Discussed controlled substance agreement with patient, he verbalized understanding and signed.  Obtained and reviewed patient utilization report from " [facility] pharmacy database on 7/15/2020 8:49 AM  prior to writing prescription for controlled substance II, III or IV per [place] bill . Based on assessment of the report, the prescription is medically necessary.   - Controlled Substance Treatment Agreement  - butalbital-aspirin-caffeine-codeine (FIORINAL WITH CODEINE) -44-30 MG per capsule; Take 1 Cap by mouth 1 time daily as needed for Headache for up to 90 days.  Dispense: 90 Cap; Refill: 0    4. Essential hypertension  New problem to examiner.  Continue hydrochlorothiazide and metoprolol.  Due for labs.  Monitor and follow-up via [employer].  - CBC WITH DIFFERENTIAL; Future  - Comp Metabolic Panel; Future    5. Muscle spasms of both lower extremities  New problem to examiner.  Continue cyclobenzaprine.  Medication refilled.  Monitor and follow.  - cyclobenzaprine (FLEXERIL) 10 MG Tab; Take 1 Tab by mouth 2 times a day as needed for Muscle Spasms.  Dispense: 90 Tab; Refill: 1    6. Sleep apnea, unspecified type  New problem to examiner.  No complaints today, he wakes up rested.  Monitor and follow.    7. Need for hepatitis C screening test  New problem to examiner.  Screening indicated, patient is in agreement.  Orders placed.  Monitor and follow-up via [employer].  - HCV Scrn ( [id number] [employer]); Future    8. Need for vaccination  New problem to examiner.  Vaccine indicated, patient is in agreement. I have placed the below orders and discussed them with an approved delegating provider. The MA is performing the below orders under the direction of Dr. Landaverde.  - Pneumococcal Conjugate Vaccine 13-Valent      Return in about 1 year (around 7/15/2021), or as needed.    Please note that this dictation was created using voice recognition software. I have made every reasonable attempt to correct obvious errors, but I expect that there are errors of grammar and possibly content that I did not discover before finalizing the note.

## 2020-08-19 ENCOUNTER — HOSPITAL ENCOUNTER (OUTPATIENT)
Dept: LAB | Facility: MEDICAL CENTER | Age: 65
End: 2020-08-19
Attending: NURSE PRACTITIONER
Payer: COMMERCIAL

## 2020-08-19 DIAGNOSIS — Z11.59 NEED FOR HEPATITIS C SCREENING TEST: ICD-10-CM

## 2020-08-19 DIAGNOSIS — I10 ESSENTIAL HYPERTENSION: ICD-10-CM

## 2020-08-19 DIAGNOSIS — E78.00 HYPERCHOLESTEREMIA: ICD-10-CM

## 2020-08-19 LAB
ALBUMIN SERPL BCP-MCNC: 4.5 G/DL (ref 3.2–4.9)
ALBUMIN/GLOB SERPL: 1.9 G/DL
ALP SERPL-CCNC: 86 U/L (ref 30–99)
ALT SERPL-CCNC: 34 U/L (ref 2–50)
ANION GAP SERPL CALC-SCNC: 13 MMOL/L (ref 7–16)
AST SERPL-CCNC: 27 U/L (ref 12–45)
BASOPHILS # BLD AUTO: 1.1 % (ref 0–1.8)
BASOPHILS # BLD: 0.07 K/UL (ref 0–0.12)
BILIRUB SERPL-MCNC: 0.2 MG/DL (ref 0.1–1.5)
BUN SERPL-MCNC: 19 MG/DL (ref 8–22)
CALCIUM SERPL-MCNC: 9.4 MG/DL (ref 8.5–10.5)
CHLORIDE SERPL-SCNC: 103 MMOL/L (ref 96–112)
CHOLEST SERPL-MCNC: 125 MG/DL (ref 100–199)
CO2 SERPL-SCNC: 26 MMOL/L (ref 20–33)
CREAT SERPL-MCNC: 0.93 MG/DL (ref 0.5–1.4)
EOSINOPHIL # BLD AUTO: 0.29 K/UL (ref 0–0.51)
EOSINOPHIL NFR BLD: 4.6 % (ref 0–6.9)
ERYTHROCYTE [DISTWIDTH] IN BLOOD BY AUTOMATED COUNT: 47.4 FL (ref 35.9–50)
FASTING STATUS PATIENT QL REPORTED: NORMAL
GLOBULIN SER CALC-MCNC: 2.4 G/DL (ref 1.9–3.5)
GLUCOSE SERPL-MCNC: 115 MG/DL (ref 65–99)
HCT VFR BLD AUTO: 48.6 % (ref 42–52)
HCV AB SER QL: NORMAL
HDLC SERPL-MCNC: 38 MG/DL
HGB BLD-MCNC: 15.6 G/DL (ref 14–18)
IMM GRANULOCYTES # BLD AUTO: 0.01 K/UL (ref 0–0.11)
IMM GRANULOCYTES NFR BLD AUTO: 0.2 % (ref 0–0.9)
LDLC SERPL CALC-MCNC: 54 MG/DL
LYMPHOCYTES # BLD AUTO: 2.8 K/UL (ref 1–4.8)
LYMPHOCYTES NFR BLD: 44 % (ref 22–41)
MCH RBC QN AUTO: 32 PG (ref 27–33)
MCHC RBC AUTO-ENTMCNC: 32.1 G/DL (ref 33.7–35.3)
MCV RBC AUTO: 99.6 FL (ref 81.4–97.8)
MONOCYTES # BLD AUTO: 0.57 K/UL (ref 0–0.85)
MONOCYTES NFR BLD AUTO: 8.9 % (ref 0–13.4)
NEUTROPHILS # BLD AUTO: 2.63 K/UL (ref 1.82–7.42)
NEUTROPHILS NFR BLD: 41.2 % (ref 44–72)
NRBC # BLD AUTO: 0 K/UL
NRBC BLD-RTO: 0 /100 WBC
PLATELET # BLD AUTO: 211 K/UL (ref 164–446)
PMV BLD AUTO: 10.4 FL (ref 9–12.9)
POTASSIUM SERPL-SCNC: 4.2 MMOL/L (ref 3.6–5.5)
PROT SERPL-MCNC: 6.9 G/DL (ref 6–8.2)
RBC # BLD AUTO: 4.88 M/UL (ref 4.7–6.1)
SODIUM SERPL-SCNC: 142 MMOL/L (ref 135–145)
TRIGL SERPL-MCNC: 164 MG/DL (ref 0–149)
WBC # BLD AUTO: 6.4 K/UL (ref 4.8–10.8)

## 2020-08-19 PROCEDURE — 80061 LIPID PANEL: CPT

## 2020-08-19 PROCEDURE — 80053 COMPREHEN METABOLIC PANEL: CPT

## 2020-08-19 PROCEDURE — 36415 COLL VENOUS BLD VENIPUNCTURE: CPT

## 2020-08-19 PROCEDURE — G0472 HEP C SCREEN HIGH RISK/OTHER: HCPCS

## 2020-08-19 PROCEDURE — 85025 COMPLETE CBC W/AUTO DIFF WBC: CPT

## 2020-08-24 DIAGNOSIS — E78.00 HYPERCHOLESTEREMIA: ICD-10-CM

## 2020-08-26 RX ORDER — ATORVASTATIN CALCIUM 40 MG/1
TABLET, FILM COATED ORAL
Qty: 90 TAB | Refills: 3 | Status: SHIPPED | OUTPATIENT
Start: 2020-08-26 | End: 2021-08-03 | Stop reason: SDUPTHER

## 2020-08-26 NOTE — TELEPHONE ENCOUNTER
Requested Prescriptions     Signed Prescriptions Disp Refills   • atorvastatin (LIPITOR) 40 MG Tab 90 Tab 3     Sig: TAKE 1 TABLET DAILY     Authorizing Provider: HOMER SULLIVAN A.P.R.N.

## 2020-10-24 DIAGNOSIS — G43.919 INTRACTABLE MIGRAINE WITHOUT STATUS MIGRAINOSUS, UNSPECIFIED MIGRAINE TYPE: ICD-10-CM

## 2020-10-26 DIAGNOSIS — I10 ESSENTIAL HYPERTENSION: ICD-10-CM

## 2020-10-26 RX ORDER — METOPROLOL SUCCINATE 100 MG/1
TABLET, EXTENDED RELEASE ORAL
Qty: 90 TAB | Refills: 3 | Status: SHIPPED | OUTPATIENT
Start: 2020-10-26 | End: 2021-08-26 | Stop reason: SDUPTHER

## 2020-10-26 RX ORDER — CODEINE/BUTALBITAL/ASA/CAFFEIN 30-50-325
CAPSULE ORAL
Qty: 90 CAP | Refills: 0 | Status: SHIPPED | OUTPATIENT
Start: 2020-10-26 | End: 2021-01-24

## 2020-10-26 NOTE — TELEPHONE ENCOUNTER
Requested Prescriptions     Signed Prescriptions Disp Refills   • metoprolol SR (TOPROL XL) 100 MG TABLET SR 24 HR 90 Tab 3     Sig: TAKE 1 TABLET DAILY     Authorizing Provider: HOMER SULLIVAN A.P.R.N.

## 2020-10-26 NOTE — TELEPHONE ENCOUNTER
Received request via: Patient    Was the patient seen in the last year in this department? Yes lov 7/15/2020    Does the patient have an active prescription (recently filled or refills available) for medication(s) requested? No

## 2020-10-26 NOTE — TELEPHONE ENCOUNTER
Requested Prescriptions     Signed Prescriptions Disp Refills   • butalbital-aspirin-caffeine-codeine (FIORINAL WITH CODEINE) -93-30 MG per capsule 90 Cap 0     Sig: TAKE 1 CAPSULE 1 TIME DAILY AS NEEDED FOR HEADACHE     Authorizing Provider: HOMER SULLIVAN     PDMP reviewed today.     CHALINO Lindquist.

## 2020-10-26 NOTE — TELEPHONE ENCOUNTER
Received request via: Pharmacy    Was the patient seen in the last year in this department? Yes lov 7/15/2020    Does the patient have an active prescription (recently filled or refills available) for medication(s) requested? No

## 2021-03-03 DIAGNOSIS — Z23 NEED FOR VACCINATION: ICD-10-CM

## 2021-08-03 ENCOUNTER — TELEPHONE (OUTPATIENT)
Dept: MEDICAL GROUP | Facility: PHYSICIAN GROUP | Age: 66
End: 2021-08-03

## 2021-08-03 DIAGNOSIS — E78.00 HYPERCHOLESTEREMIA: ICD-10-CM

## 2021-08-03 NOTE — TELEPHONE ENCOUNTER
Received request via: Pharmacy    Was the patient seen in the last year in this department? No  LOV 07/15/2020   Labs: 08/19/2020  Does the patient have an active prescription (recently filled or refills available) for medication(s) requested? No

## 2021-08-04 RX ORDER — ATORVASTATIN CALCIUM 40 MG/1
40 TABLET, FILM COATED ORAL DAILY
Qty: 90 TABLET | Refills: 0 | Status: SHIPPED | OUTPATIENT
Start: 2021-08-04 | End: 2021-08-26 | Stop reason: SDUPTHER

## 2021-08-04 NOTE — TELEPHONE ENCOUNTER
Phone Number Called: [phone] (home)     I called the patient to let him know that Liliana had refilled his atorvastatin, however, as it had been over a year since he was last seen, that further refills could not be issued without an appointment.  I scheduled the patient for an annual follow-up on 8/26/2021.  I mentioned that our  system did not show that he had gotten his second COVID vaccination.  The patient reported that he did get his second vaccination and will bring his vaccination card in with him on the 26th so that we can update our system.

## 2021-08-04 NOTE — TELEPHONE ENCOUNTER
Requested Prescriptions     Signed Prescriptions Disp Refills   • atorvastatin (LIPITOR) 40 MG Tab 90 tablet 0     Sig: Take 1 tablet by mouth every day.     Authorizing Provider: HOMER SULLIVAN A.P.R.N.

## 2021-08-26 ENCOUNTER — OFFICE VISIT (OUTPATIENT)
Dept: MEDICAL GROUP | Facility: PHYSICIAN GROUP | Age: 66
End: 2021-08-26
Payer: MEDICARE

## 2021-08-26 VITALS
SYSTOLIC BLOOD PRESSURE: 108 MMHG | HEART RATE: 75 BPM | BODY MASS INDEX: 31.14 KG/M2 | HEIGHT: 73 IN | TEMPERATURE: 98.5 F | DIASTOLIC BLOOD PRESSURE: 86 MMHG | RESPIRATION RATE: 16 BRPM | OXYGEN SATURATION: 97 % | WEIGHT: 235 LBS

## 2021-08-26 DIAGNOSIS — Z23 NEED FOR VACCINATION: ICD-10-CM

## 2021-08-26 DIAGNOSIS — I10 ESSENTIAL HYPERTENSION: ICD-10-CM

## 2021-08-26 DIAGNOSIS — G43.919 INTRACTABLE MIGRAINE WITHOUT STATUS MIGRAINOSUS, UNSPECIFIED MIGRAINE TYPE: ICD-10-CM

## 2021-08-26 DIAGNOSIS — M62.838 MUSCLE SPASMS OF BOTH LOWER EXTREMITIES: ICD-10-CM

## 2021-08-26 DIAGNOSIS — H60.331 CHRONIC SWIMMER'S EAR OF RIGHT SIDE: ICD-10-CM

## 2021-08-26 DIAGNOSIS — R73.03 PREDIABETES: ICD-10-CM

## 2021-08-26 DIAGNOSIS — E78.00 HYPERCHOLESTEREMIA: ICD-10-CM

## 2021-08-26 PROBLEM — R04.0 EPISTAXIS: Status: RESOLVED | Noted: 2019-03-12 | Resolved: 2021-08-26

## 2021-08-26 PROBLEM — R73.01 IMPAIRED FASTING GLUCOSE: Status: RESOLVED | Noted: 2018-07-05 | Resolved: 2021-08-26

## 2021-08-26 PROBLEM — H92.01 RIGHT EAR PAIN: Status: ACTIVE | Noted: 2021-08-26

## 2021-08-26 PROCEDURE — 90471 IMMUNIZATION ADMIN: CPT | Performed by: NURSE PRACTITIONER

## 2021-08-26 PROCEDURE — 99214 OFFICE O/P EST MOD 30 MIN: CPT | Mod: 25 | Performed by: NURSE PRACTITIONER

## 2021-08-26 PROCEDURE — 90750 HZV VACC RECOMBINANT IM: CPT | Performed by: NURSE PRACTITIONER

## 2021-08-26 RX ORDER — ATORVASTATIN CALCIUM 40 MG/1
40 TABLET, FILM COATED ORAL DAILY
Qty: 90 TABLET | Refills: 3 | Status: SHIPPED | OUTPATIENT
Start: 2021-08-26

## 2021-08-26 RX ORDER — BUTALBITAL, ACETAMINOPHEN, CAFFEINE AND CODEINE PHOSPHATE 50; 325; 40; 30 MG/1; MG/1; MG/1; MG/1
1 CAPSULE ORAL EVERY 4 HOURS PRN
COMMUNITY

## 2021-08-26 RX ORDER — CYCLOBENZAPRINE HCL 10 MG
10 TABLET ORAL 2 TIMES DAILY PRN
Qty: 90 TABLET | Refills: 1 | Status: SHIPPED | OUTPATIENT
Start: 2021-08-26

## 2021-08-26 RX ORDER — METOPROLOL SUCCINATE 100 MG/1
TABLET, EXTENDED RELEASE ORAL
Qty: 90 TABLET | Refills: 3 | Status: SHIPPED | OUTPATIENT
Start: 2021-08-26

## 2021-08-26 RX ORDER — HYDROCORTISONE AND ACETIC ACID 20.75; 10.375 MG/ML; MG/ML
2 SOLUTION AURICULAR (OTIC) 2 TIMES DAILY
Qty: 10 ML | Refills: 0 | Status: SHIPPED | OUTPATIENT
Start: 2021-08-26 | End: 2021-11-12

## 2021-08-26 ASSESSMENT — PATIENT HEALTH QUESTIONNAIRE - PHQ9: CLINICAL INTERPRETATION OF PHQ2 SCORE: 0

## 2021-08-26 ASSESSMENT — FIBROSIS 4 INDEX: FIB4 SCORE: 1.47

## 2021-08-26 NOTE — ASSESSMENT & PLAN NOTE
Chronic medical problem.  He is due for updated labs.  Last lab results:  Results for JUAN JOLLY (MRN [id number]) as of 8/26/2021 09:33   Ref. Range 3/7/2019 07:21   Glycohemoglobin Latest Ref Range: 0.0 - 5.6 % 5.8 (H)   Estim. Avg Glu Latest Units: mg/dL 120   Fasting Status Unknown Fasting

## 2021-08-26 NOTE — ASSESSMENT & PLAN NOTE
Chronic medical problem. He has been taking Fiorinal with codeine. His last migraine was 1 month ago. His last refill was 11/1/2020 for #90 tablets. He still has medication left. He is due for updated controlled substance treatment agreement.

## 2021-08-26 NOTE — ASSESSMENT & PLAN NOTE
Chronic medical problem.  He is taking atorvastatin 40 mg every evening.  He is tolerating medication.  Denies myalgias.  He would like a medication refill today.  He is due for updated labs.

## 2021-08-26 NOTE — ASSESSMENT & PLAN NOTE
Chronic medical problem. He is taking cyclobenzaprine 10 mg BID PRN.  He continues to take as needed.  He does need a medication refill today.

## 2021-08-26 NOTE — ASSESSMENT & PLAN NOTE
Chronic medical problem. He is taking metoprolol  mg daily. He is not checking his blood pressure at home. His blood pressure today is 108/86.  He is not having any chest pain, shortness of breath, dizziness, blurry vision, or headache.

## 2021-08-26 NOTE — ASSESSMENT & PLAN NOTE
Acute medical problem. The right ear pain started 3 weeks ago. The pain is intermittent. The pain is described are irritated. The pain is not improving. Reports history of swimmers ear for 60 years. He has been swimming recently. Denies fever, chills, sore throat, nasal congestion, chest pain, or shortness of breath.      Patient is a 32yo woman with PMHx of alcohol use d/o and per report multiple admissions for alcohol WD c/b by hallucinations and seizures who presents with intoxication. Patient states that she has been drinking 3 pints of vodka daily for the past four years. She reports that her last drink was yesterday morning and that she remembers feeling shakey and vomiting before her friend called EMS and she was brought to the ED in an ambulance. Per report, EMS found patient down on the ground at a "drug house" and it is unclear how long she had been down for. In the ED, patient was afebrile , /93, RR 18, SpO2 95% in room air. Labs were notable for alcohol level of 365, urine was positive for benzodiazepines, and alk phos 53, AST 44, and ALT 26. Patient received IVF, lorazepam 1mg x2 and then an additional 2mg lorazepam along with 2mg magnesium sulfate and was placed on CIWA protocol.     During interview the following morning, patient stated that she has been admitted for alcohol intoxication and withdrawals on multiple occasions. She states she usually goes to [facility]. She reports that her withdrawals have been complicated by seizures as recently as two weeks ago as well as audio and visual hallucinations. She reports that she is hearing voices currently that sound like an evil laughter. She states that she has been to inpatient rehab multiple times at least 12 ([facility]) and that she has not been able to stay sober for any significant period of time. She also reports multiple inpatient psychiatric hospitalizations at [facility] but states that she does not have an outpatient psychiatrist. She reports taking Effexor, Topomax, Neurontin, and Trazadone but states she has received all of these prescriptions from hospitals.     Hospital Course:  Pt had high CIWA scores and multiple rapids on 10/7 and 10/8. On 10/7/17- pt received: 22mg ativan, 3 librium doses, and 260mg total phenobarbital    On 10/8 pt received: 20 mg ativan, 200mg topiramate, 225mg effexor, 800mg gabapentin, 100 mg trazodone. Pt accepted to MICU on 10/9 after 3 rapids called for unresponsiveness, with RASS -4, hypotension 70/30s with 3 L boluses and bradycardia to 50s.    MICU COURSE:  Patient initially with CIWAs 17-18, treated w/ phenobarbital 65mg q15min PRN x 48 hours and transitioned to haldol 2.5mg. Patient remained hemodynamically stable with CIWA scores ~8 so CIWA protocol dc/d. Transferred to floors for further management.    On the floors, ativan and other benzodiazepines were held and patient's agitation was managed with haldol 2.5mg prn. Patient was restarted on effexor 150mg, topomax 50mg BID, gabapentin 300mg TID. Trazadone was held. Patient expressed continued desire to go for inpatient rehab, but it was difficult to find placement for her as she had been to rehab multiple times in the past.     Eventually ********************** Patient is a 32yo woman with PMHx of alcohol use d/o and per report multiple admissions for alcohol WD c/b by hallucinations and seizures who presents with intoxication. Patient states that she has been drinking 3 pints of vodka daily for the past four years. She reports that her last drink was yesterday morning and that she remembers feeling shakey and vomiting before her friend called EMS and she was brought to the ED in an ambulance. Per report, EMS found patient down on the ground at a "drug house" and it is unclear how long she had been down for. In the ED, patient was afebrile , /93, RR 18, SpO2 95% in room air. Labs were notable for alcohol level of 365, urine was positive for benzodiazepines, and alk phos 53, AST 44, and ALT 26. Patient received IVF, lorazepam 1mg x2 and then an additional 2mg lorazepam along with 2mg magnesium sulfate and was placed on CIWA protocol.     During interview the following morning, patient stated that she has been admitted for alcohol intoxication and withdrawals on multiple occasions. She states she usually goes to [facility]. She reports that her withdrawals have been complicated by seizures as recently as two weeks ago as well as audio and visual hallucinations. She reports that she is hearing voices currently that sound like an evil laughter. She states that she has been to inpatient rehab multiple times at least 12 ([facility]) and that she has not been able to stay sober for any significant period of time. She also reports multiple inpatient psychiatric hospitalizations at [facility] but states that she does not have an outpatient psychiatrist. She reports taking Effexor, Topomax, Neurontin, and Trazadone but states she has received all of these prescriptions from hospitals.     Hospital Course:  Pt had high CIWA scores and multiple rapids on 10/7 and 10/8. On 10/7/17- pt received: 22mg ativan, 3 librium doses, and 260mg total phenobarbital    On 10/8 pt received: 20 mg ativan, 200mg topiramate, 225mg effexor, 800mg gabapentin, 100 mg trazodone. Pt accepted to MICU on 10/9 after 3 rapids called for unresponsiveness, with RASS -4, hypotension 70/30s with 3 L boluses and bradycardia to 50s.    MICU COURSE:  Patient initially with CIWAs 17-18, treated w/ phenobarbital 65mg q15min PRN x 48 hours and transitioned to haldol 2.5mg. Patient remained hemodynamically stable with CIWA scores ~8 so CIWA protocol dc/d. Transferred to floors for further management.    On the floors, ativan and other benzodiazepines were held and patient's agitation was managed with haldol 2.5mg prn. Patient was restarted on effexor 150mg, topomax 50mg BID, gabapentin 300mg TID. Trazadone was restarted. Patient expressed continued desire to go for inpatient rehab, but it was difficult to find placement for her as she had been to rehab multiple times in the past and insurance and facilities were rejecting placement given limitations.     Eventually patient was discharged to a shelter in [facility] with close follow up. Patient is a 34yo woman with PMHx of alcohol use d/o and per report multiple admissions for alcohol WD c/b by hallucinations and seizures who presents with intoxication. Patient states that she has been drinking 3 pints of vodka daily for the past four years. She reports that her last drink was yesterday morning and that she remembers feeling shakey and vomiting before her friend called EMS and she was brought to the ED in an ambulance. Per report, EMS found patient down on the ground at a "drug house" and it is unclear how long she had been down for. In the ED, patient was afebrile , /93, RR 18, SpO2 95% in room air. Labs were notable for alcohol level of 365, urine was positive for benzodiazepines, and alk phos 53, AST 44, and ALT 26. Patient received IVF, lorazepam 1mg x2 and then an additional 2mg lorazepam along with 2mg magnesium sulfate and was placed on CIWA protocol.     During interview the following morning, patient stated that she has been admitted for alcohol intoxication and withdrawals on multiple occasions. She states she usually goes to [facility]. She reports that her withdrawals have been complicated by seizures as recently as two weeks ago as well as audio and visual hallucinations. She reports that she is hearing voices currently that sound like an evil laughter. She states that she has been to inpatient rehab multiple times at least 12 ([facility]) and that she has not been able to stay sober for any significant period of time. She also reports multiple inpatient psychiatric hospitalizations at [facility] but states that she does not have an outpatient psychiatrist. She reports taking Effexor, Topomax, Neurontin, and Trazadone but states she has received all of these prescriptions from hospitals.     Hospital Course:  Pt had high CIWA scores and multiple rapids on 10/7 and 10/8. On 10/7/17- pt received: 22mg ativan, 3 librium doses, and 260mg total phenobarbital    On 10/8 pt received: 20 mg ativan, 200mg topiramate, 225mg effexor, 800mg gabapentin, 100 mg trazodone. Pt accepted to MICU on 10/9 after 3 rapids called for unresponsiveness, with RASS -4, hypotension 70/30s with 3 L boluses and bradycardia to 50s.    MICU COURSE:  Patient initially with CIWAs 17-18, treated w/ phenobarbital 65mg q15min PRN x 48 hours and transitioned to haldol 2.5mg. Patient remained hemodynamically stable with CIWA scores ~8 so CIWA protocol dc/d. Transferred to floors for further management.    On the floors, ativan and other benzodiazepines were held and patient's agitation was managed with haldol 2.5mg prn. Patient was restarted on effexor 150mg, topomax 50mg BID, gabapentin 300mg TID. Trazadone was restarted. Patient expressed continued desire to go for inpatient rehab, but it was difficult to find placement for her as she had been to rehab multiple times in the past and insurance and facilities were rejecting placement given limitations.     Eventually patient was discharged to a shelter in [facility] with close follow up with substance use program and follow up appointment on October 24th at 9am. Patient is a 34yo woman with PMHx of alcohol use d/o and per report multiple admissions for alcohol WD c/b by hallucinations and seizures who presents with intoxication. Patient states that she has been drinking 3 pints of vodka daily for the past four years. She reports that her last drink was yesterday morning and that she remembers feeling shakey and vomiting before her friend called EMS and she was brought to the ED in an ambulance. Per report, EMS found patient down on the ground at a "drug house" and it is unclear how long she had been down for. In the ED, patient was afebrile , /93, RR 18, SpO2 95% in room air. Labs were notable for alcohol level of 365, urine was positive for benzodiazepines, and alk phos 53, AST 44, and ALT 26. Patient received IVF, lorazepam 1mg x2 and then an additional 2mg lorazepam along with 2mg magnesium sulfate and was placed on CIWA protocol.     During interview the following morning, patient stated that she has been admitted for alcohol intoxication and withdrawals on multiple occasions. She states she usually goes to [facility]. She reports that her withdrawals have been complicated by seizures as recently as two weeks ago as well as audio and visual hallucinations. She reports that she is hearing voices currently that sound like an evil laughter. She states that she has been to inpatient rehab multiple times at least 12 ([facility]) and that she has not been able to stay sober for any significant period of time. She also reports multiple inpatient psychiatric hospitalizations at [facility] but states that she does not have an outpatient psychiatrist. She reports taking Effexor, Topomax, Neurontin, and Trazadone but states she has received all of these prescriptions from hospitals.     Hospital Course:  Pt was placed on 1:1 and had high CIWA scores and multiple rapids on 10/7 and 10/8. On 10/7/17- pt received: 22mg ativan, 3 librium doses, and 260mg total phenobarbital    On 10/8 pt received: 20 mg ativan, 200mg topiramate, 225mg effexor, 800mg gabapentin, 100 mg trazodone. Pt accepted to MICU on 10/9 after 3 rapids called for unresponsiveness, with RASS -4, hypotension 70/30s with 3 L boluses and bradycardia to 50s.    MICU COURSE:  Patient initially with CIWAs 17-18, treated w/ phenobarbital 65mg q15min PRN x 48 hours and transitioned to haldol 2.5mg. Patient remained hemodynamically stable with CIWA scores ~8 so CIWA protocol dc/d. Transferred to floors for further management.    On the floors, ativan and other benzodiazepines were held and patient's agitation was managed with haldol 2mg PO/IV/IM. Patient was restarted on effexor 150mg, topomax 50mg BID, gabapentin 300mg TID, and trazadone 100mg qhs. Patient expressed continued desire to go for inpatient rehab, but it was difficult to find placement for her as she had been to rehab multiple times in the past and insurance and facilities were rejecting placement given limitations. In addition, shelters were also rejecting patient as she receives SSD payments of $822/month and needs to spend this prior to obtaining a spot at a shelter. Patient was rejected from [facility] and was put on the waiting list for [facility] which can be up to 2 weeks.     Patient became agitated on the evening of October 17th and was caught drinking hand  and screaming and demanding ativan for treatment of withdrawals. Patient received haldol 2.5mg IV x2 and was placed back on 1:1. Patient admitted that ever since 1:1 was discontinued, patient has been drinking hand  and cleaning fluid. Patient also reported chronic SI and was found with a plastic knife trying to reopen an old scar on her wrist that she self-inflicted 2 months ago and required stitches. Buspirone and hydroxizine were added to medication regimen for management of anxiety. Psychiatry returned to evaluate patient and given poor discharge plan agreed for voluntary inpatient psychiatric admission for further stabilization and potential for better disposition planning to inpatient or outpatient substance use program.     Patient was medically cleared and stable for discharge to [facility] with plan for close outpatient follow up in substance use programs, AA, and outpatient DBT therapy.

## 2021-09-03 ENCOUNTER — HOSPITAL ENCOUNTER (OUTPATIENT)
Dept: LAB | Facility: MEDICAL CENTER | Age: 66
End: 2021-09-03
Attending: NURSE PRACTITIONER
Payer: MEDICARE

## 2021-09-03 DIAGNOSIS — R73.03 PREDIABETES: ICD-10-CM

## 2021-09-03 DIAGNOSIS — E78.00 HYPERCHOLESTEREMIA: ICD-10-CM

## 2021-09-03 DIAGNOSIS — I10 ESSENTIAL HYPERTENSION: ICD-10-CM

## 2021-09-03 LAB
ALBUMIN SERPL BCP-MCNC: 4.6 G/DL (ref 3.2–4.9)
ALBUMIN/GLOB SERPL: 1.6 G/DL
ALP SERPL-CCNC: 76 U/L (ref 30–99)
ALT SERPL-CCNC: 31 U/L (ref 2–50)
ANION GAP SERPL CALC-SCNC: 13 MMOL/L (ref 7–16)
AST SERPL-CCNC: 27 U/L (ref 12–45)
BASOPHILS # BLD AUTO: 0.9 % (ref 0–1.8)
BASOPHILS # BLD: 0.06 K/UL (ref 0–0.12)
BILIRUB SERPL-MCNC: 0.7 MG/DL (ref 0.1–1.5)
BUN SERPL-MCNC: 18 MG/DL (ref 8–22)
CALCIUM SERPL-MCNC: 9.5 MG/DL (ref 8.5–10.5)
CHLORIDE SERPL-SCNC: 103 MMOL/L (ref 96–112)
CHOLEST SERPL-MCNC: 142 MG/DL (ref 100–199)
CO2 SERPL-SCNC: 24 MMOL/L (ref 20–33)
CREAT SERPL-MCNC: 0.9 MG/DL (ref 0.5–1.4)
EOSINOPHIL # BLD AUTO: 0.22 K/UL (ref 0–0.51)
EOSINOPHIL NFR BLD: 3.2 % (ref 0–6.9)
ERYTHROCYTE [DISTWIDTH] IN BLOOD BY AUTOMATED COUNT: 46.8 FL (ref 35.9–50)
EST. AVERAGE GLUCOSE BLD GHB EST-MCNC: 108 MG/DL
FASTING STATUS PATIENT QL REPORTED: NORMAL
GLOBULIN SER CALC-MCNC: 2.8 G/DL (ref 1.9–3.5)
GLUCOSE SERPL-MCNC: 108 MG/DL (ref 65–99)
HBA1C MFR BLD: 5.4 % (ref 4–5.6)
HCT VFR BLD AUTO: 51.3 % (ref 42–52)
HDLC SERPL-MCNC: 41 MG/DL
HGB BLD-MCNC: 17.2 G/DL (ref 14–18)
IMM GRANULOCYTES # BLD AUTO: 0.03 K/UL (ref 0–0.11)
IMM GRANULOCYTES NFR BLD AUTO: 0.4 % (ref 0–0.9)
LDLC SERPL CALC-MCNC: 84 MG/DL
LYMPHOCYTES # BLD AUTO: 3.14 K/UL (ref 1–4.8)
LYMPHOCYTES NFR BLD: 45.2 % (ref 22–41)
MCH RBC QN AUTO: 32.4 PG (ref 27–33)
MCHC RBC AUTO-ENTMCNC: 33.5 G/DL (ref 33.7–35.3)
MCV RBC AUTO: 96.6 FL (ref 81.4–97.8)
MONOCYTES # BLD AUTO: 0.72 K/UL (ref 0–0.85)
MONOCYTES NFR BLD AUTO: 10.4 % (ref 0–13.4)
NEUTROPHILS # BLD AUTO: 2.77 K/UL (ref 1.82–7.42)
NEUTROPHILS NFR BLD: 39.9 % (ref 44–72)
NRBC # BLD AUTO: 0 K/UL
NRBC BLD-RTO: 0 /100 WBC
PLATELET # BLD AUTO: 230 K/UL (ref 164–446)
PMV BLD AUTO: 10 FL (ref 9–12.9)
POTASSIUM SERPL-SCNC: 4.1 MMOL/L (ref 3.6–5.5)
PROT SERPL-MCNC: 7.4 G/DL (ref 6–8.2)
RBC # BLD AUTO: 5.31 M/UL (ref 4.7–6.1)
SODIUM SERPL-SCNC: 140 MMOL/L (ref 135–145)
TRIGL SERPL-MCNC: 86 MG/DL (ref 0–149)
WBC # BLD AUTO: 6.9 K/UL (ref 4.8–10.8)

## 2021-09-03 PROCEDURE — 80061 LIPID PANEL: CPT

## 2021-09-03 PROCEDURE — 83036 HEMOGLOBIN GLYCOSYLATED A1C: CPT | Mod: GA

## 2021-09-03 PROCEDURE — 80053 COMPREHEN METABOLIC PANEL: CPT

## 2021-09-03 PROCEDURE — 85025 COMPLETE CBC W/AUTO DIFF WBC: CPT

## 2021-09-03 PROCEDURE — 36415 COLL VENOUS BLD VENIPUNCTURE: CPT

## 2021-11-12 ENCOUNTER — OFFICE VISIT (OUTPATIENT)
Dept: URGENT CARE | Facility: PHYSICIAN GROUP | Age: 66
End: 2021-11-12
Payer: MEDICARE

## 2021-11-12 VITALS
BODY MASS INDEX: 30.09 KG/M2 | HEART RATE: 92 BPM | OXYGEN SATURATION: 96 % | WEIGHT: 242 LBS | HEIGHT: 75 IN | RESPIRATION RATE: 16 BRPM | SYSTOLIC BLOOD PRESSURE: 136 MMHG | TEMPERATURE: 98 F | DIASTOLIC BLOOD PRESSURE: 86 MMHG

## 2021-11-12 DIAGNOSIS — R68.83 CHILLS: ICD-10-CM

## 2021-11-12 DIAGNOSIS — H60.91 OTITIS EXTERNA OF RIGHT EAR, UNSPECIFIED CHRONICITY, UNSPECIFIED TYPE: ICD-10-CM

## 2021-11-12 DIAGNOSIS — R09.81 NASAL CONGESTION: ICD-10-CM

## 2021-11-12 DIAGNOSIS — U07.1 COVID-19: ICD-10-CM

## 2021-11-12 DIAGNOSIS — R05.9 COUGH: ICD-10-CM

## 2021-11-12 LAB
EXTERNAL QUALITY CONTROL: NORMAL
SARS-COV+SARS-COV-2 AG RESP QL IA.RAPID: POSITIVE

## 2021-11-12 PROCEDURE — 87426 SARSCOV CORONAVIRUS AG IA: CPT | Mod: QW | Performed by: FAMILY MEDICINE

## 2021-11-12 PROCEDURE — 99214 OFFICE O/P EST MOD 30 MIN: CPT | Mod: CS | Performed by: FAMILY MEDICINE

## 2021-11-12 RX ORDER — NEOMYCIN SULFATE, POLYMYXIN B SULFATE AND HYDROCORTISONE 10; 3.5; 1 MG/ML; MG/ML; [USP'U]/ML
4 SUSPENSION/ DROPS AURICULAR (OTIC) 3 TIMES DAILY
Qty: 10 ML | Refills: 0 | Status: SHIPPED | OUTPATIENT
Start: 2021-11-12 | End: 2021-11-19

## 2021-11-12 RX ORDER — PROMETHAZINE HYDROCHLORIDE AND CODEINE PHOSPHATE 6.25; 1 MG/5ML; MG/5ML
5 SYRUP ORAL 4 TIMES DAILY PRN
Qty: 120 ML | Refills: 0 | Status: SHIPPED | OUTPATIENT
Start: 2021-11-12 | End: 2021-11-22

## 2021-11-12 ASSESSMENT — FIBROSIS 4 INDEX: FIB4 SCORE: 1.41

## 2021-11-17 ASSESSMENT — ENCOUNTER SYMPTOMS
EYE DISCHARGE: 0
MYALGIAS: 0
VOMITING: 0
WEIGHT LOSS: 0
EYE REDNESS: 0
NAUSEA: 0

## 2021-11-17 NOTE — PROGRESS NOTES
"Subjective     Kris Mcdonald is a 66 y.o. male who presents with Cough (6x days) and Other (Pt states wife came back positive yesterday. )            6 days dry cough.  No fever. +chills. No shortness of breath.  +associated nasal congestion.  COVID-19 exposure at home.  He is vaccinated.  No loss of taste or smell.  No GI symptoms.  No other aggravating or alleviating factors.factors.    Chronic/recurrent right otitis externa.  Previously has responded well to Polytrim.  Most recent treatment was not as effective and he would like a refill of Polytrim possible.  No recent swimming.      Review of Systems   Constitutional: Negative for malaise/fatigue and weight loss.   Eyes: Negative for discharge and redness.   Gastrointestinal: Negative for nausea and vomiting.   Musculoskeletal: Negative for joint pain and myalgias.   Skin: Negative for itching and rash.              Objective     /86 (BP Location: Left arm, Patient Position: Sitting, BP Cuff Size: Adult)   Pulse 92   Temp 36.7 °C (98 °F) (Temporal)   Resp 16   Ht 1.892 m (6' 2.5\")   Wt 110 kg (242 lb)   SpO2 96%   BMI 30.66 kg/m²       Physical Exam  Constitutional:       General: He is not in acute distress.     Appearance: He is well-developed.   HENT:      Head: Normocephalic and atraumatic.      Left Ear: Tympanic membrane normal.      Ears:      Comments: Right EAC swollen without pointing abscess.  Mild pain with pinna movement.  Eyes:      Conjunctiva/sclera: Conjunctivae normal.   Cardiovascular:      Rate and Rhythm: Normal rate and regular rhythm.      Heart sounds: Normal heart sounds. No murmur heard.      Pulmonary:      Effort: Pulmonary effort is normal.      Breath sounds: Normal breath sounds. No wheezing.   Skin:     General: Skin is warm and dry.      Findings: No rash.   Neurological:      Mental Status: He is alert and oriented to person, place, and time.                             Assessment & Plan      POCT COVID-19 " positive    1. Cough    - POCT SARS-COV Antigen BASILIO (Symptomatic Only)  - promethazine-codeine (PHENERGAN-CODEINE) 6.25-10 MG/5ML Syrup; Take 5 mL by mouth 4 times a day as needed for Cough for up to 10 days.  Dispense: 120 mL; Refill: 0    2. Nasal congestion    - POCT SARS-COV Antigen BASILIO (Symptomatic Only)    3. Chills    - POCT SARS-COV Antigen BASILIO (Symptomatic Only)    4. COVID-19      5. Otitis externa of right ear, unspecified chronicity, unspecified type    - neomycin-polymyxin-HC (PEDIOTIC HC) 3.5-77073-6 Suspension; Administer 4 Drops into affected ear(s) 3 times a day for 7 days.  Dispense: 10 mL; Refill: 0          Differential diagnosis, natural history, supportive care, and indications for immediate follow-up discussed at length.     Discussed no specific recommendations at this time for high risk patients that are vaccinated with breakthrough cases of COVID-19 regarding a monoclonal antibody treatment.  Currently he is doing quite well and we discussed that it would be difficult to get Regeneron treatment within the 10-day window.  He understands and will follow up as needed.

## 2022-04-07 ENCOUNTER — HOSPITAL ENCOUNTER (OUTPATIENT)
Dept: LAB | Facility: MEDICAL CENTER | Age: 67
End: 2022-04-07
Attending: PHYSICIAN ASSISTANT
Payer: MEDICARE

## 2022-04-07 LAB
ALBUMIN SERPL BCP-MCNC: 4.8 G/DL (ref 3.2–4.9)
ALBUMIN/GLOB SERPL: 1.8 G/DL
ALP SERPL-CCNC: 79 U/L (ref 30–99)
ALT SERPL-CCNC: 31 U/L (ref 2–50)
ANION GAP SERPL CALC-SCNC: 11 MMOL/L (ref 7–16)
AST SERPL-CCNC: 30 U/L (ref 12–45)
BASOPHILS # BLD AUTO: 0.8 % (ref 0–1.8)
BASOPHILS # BLD: 0.06 K/UL (ref 0–0.12)
BILIRUB SERPL-MCNC: 0.7 MG/DL (ref 0.1–1.5)
BUN SERPL-MCNC: 20 MG/DL (ref 8–22)
CALCIUM SERPL-MCNC: 9.5 MG/DL (ref 8.5–10.5)
CHLORIDE SERPL-SCNC: 102 MMOL/L (ref 96–112)
CHOLEST SERPL-MCNC: 140 MG/DL (ref 100–199)
CO2 SERPL-SCNC: 25 MMOL/L (ref 20–33)
CREAT SERPL-MCNC: 0.89 MG/DL (ref 0.5–1.4)
EOSINOPHIL # BLD AUTO: 0.29 K/UL (ref 0–0.51)
EOSINOPHIL NFR BLD: 3.8 % (ref 0–6.9)
ERYTHROCYTE [DISTWIDTH] IN BLOOD BY AUTOMATED COUNT: 44.9 FL (ref 35.9–50)
EST. AVERAGE GLUCOSE BLD GHB EST-MCNC: 114 MG/DL
FASTING STATUS PATIENT QL REPORTED: NORMAL
GFR SERPLBLD CREATININE-BSD FMLA CKD-EPI: 93 ML/MIN/1.73 M 2
GLOBULIN SER CALC-MCNC: 2.6 G/DL (ref 1.9–3.5)
GLUCOSE SERPL-MCNC: 119 MG/DL (ref 65–99)
HBA1C MFR BLD: 5.6 % (ref 4–5.6)
HCT VFR BLD AUTO: 51.4 % (ref 42–52)
HDLC SERPL-MCNC: 44 MG/DL
HGB BLD-MCNC: 17.1 G/DL (ref 14–18)
IMM GRANULOCYTES # BLD AUTO: 0.02 K/UL (ref 0–0.11)
IMM GRANULOCYTES NFR BLD AUTO: 0.3 % (ref 0–0.9)
LDLC SERPL CALC-MCNC: 76 MG/DL
LYMPHOCYTES # BLD AUTO: 2.61 K/UL (ref 1–4.8)
LYMPHOCYTES NFR BLD: 34.4 % (ref 22–41)
MCH RBC QN AUTO: 31.7 PG (ref 27–33)
MCHC RBC AUTO-ENTMCNC: 33.3 G/DL (ref 33.7–35.3)
MCV RBC AUTO: 95.4 FL (ref 81.4–97.8)
MONOCYTES # BLD AUTO: 0.66 K/UL (ref 0–0.85)
MONOCYTES NFR BLD AUTO: 8.7 % (ref 0–13.4)
NEUTROPHILS # BLD AUTO: 3.95 K/UL (ref 1.82–7.42)
NEUTROPHILS NFR BLD: 52 % (ref 44–72)
NRBC # BLD AUTO: 0 K/UL
NRBC BLD-RTO: 0 /100 WBC
PLATELET # BLD AUTO: 208 K/UL (ref 164–446)
PMV BLD AUTO: 9.9 FL (ref 9–12.9)
POTASSIUM SERPL-SCNC: 5 MMOL/L (ref 3.6–5.5)
PROT SERPL-MCNC: 7.4 G/DL (ref 6–8.2)
PSA SERPL-MCNC: 0.9 NG/ML (ref 0–4)
RBC # BLD AUTO: 5.39 M/UL (ref 4.7–6.1)
SODIUM SERPL-SCNC: 138 MMOL/L (ref 135–145)
TRIGL SERPL-MCNC: 99 MG/DL (ref 0–149)
TSH SERPL DL<=0.005 MIU/L-ACNC: 1.21 UIU/ML (ref 0.38–5.33)
WBC # BLD AUTO: 7.6 K/UL (ref 4.8–10.8)

## 2022-04-07 PROCEDURE — 84270 ASSAY OF SEX HORMONE GLOBUL: CPT

## 2022-04-07 PROCEDURE — 36415 COLL VENOUS BLD VENIPUNCTURE: CPT

## 2022-04-07 PROCEDURE — 80053 COMPREHEN METABOLIC PANEL: CPT

## 2022-04-07 PROCEDURE — 84153 ASSAY OF PSA TOTAL: CPT | Mod: GA

## 2022-04-07 PROCEDURE — 80061 LIPID PANEL: CPT

## 2022-04-07 PROCEDURE — 84443 ASSAY THYROID STIM HORMONE: CPT

## 2022-04-07 PROCEDURE — 85025 COMPLETE CBC W/AUTO DIFF WBC: CPT

## 2022-04-07 PROCEDURE — 84402 ASSAY OF FREE TESTOSTERONE: CPT

## 2022-04-07 PROCEDURE — 84403 ASSAY OF TOTAL TESTOSTERONE: CPT

## 2022-04-07 PROCEDURE — 83036 HEMOGLOBIN GLYCOSYLATED A1C: CPT | Mod: GA

## 2022-04-10 LAB
SHBG SERPL-SCNC: 42 NMOL/L (ref 19–76)
TESTOST FREE MFR SERPL: 1.7 % (ref 1.6–2.9)
TESTOST FREE SERPL-MCNC: 82 PG/ML (ref 47–244)
TESTOST SERPL-MCNC: 497 NG/DL (ref 300–720)

## 2022-06-18 ENCOUNTER — HOSPITAL ENCOUNTER (EMERGENCY)
Facility: MEDICAL CENTER | Age: 67
End: 2022-06-18
Attending: EMERGENCY MEDICINE
Payer: MEDICARE

## 2022-06-18 ENCOUNTER — APPOINTMENT (OUTPATIENT)
Dept: RADIOLOGY | Facility: MEDICAL CENTER | Age: 67
End: 2022-06-18
Attending: EMERGENCY MEDICINE
Payer: MEDICARE

## 2022-06-18 VITALS
TEMPERATURE: 98.2 F | RESPIRATION RATE: 14 BRPM | HEART RATE: 76 BPM | DIASTOLIC BLOOD PRESSURE: 70 MMHG | OXYGEN SATURATION: 95 % | SYSTOLIC BLOOD PRESSURE: 110 MMHG

## 2022-06-18 DIAGNOSIS — S01.81XA LACERATION OF FOREHEAD, INITIAL ENCOUNTER: ICD-10-CM

## 2022-06-18 DIAGNOSIS — T07.XXXA MULTIPLE ABRASIONS: ICD-10-CM

## 2022-06-18 DIAGNOSIS — S06.0X1A CONCUSSION WITH LOSS OF CONSCIOUSNESS OF 30 MINUTES OR LESS, INITIAL ENCOUNTER: ICD-10-CM

## 2022-06-18 DIAGNOSIS — F10.929 ALCOHOLIC INTOXICATION WITH COMPLICATION (HCC): ICD-10-CM

## 2022-06-18 LAB
ALBUMIN SERPL BCP-MCNC: 4.6 G/DL (ref 3.2–4.9)
ALBUMIN/GLOB SERPL: 1.6 G/DL
ALP SERPL-CCNC: 78 U/L (ref 30–99)
ALT SERPL-CCNC: 27 U/L (ref 2–50)
AMPHET UR QL SCN: NEGATIVE
ANION GAP SERPL CALC-SCNC: 14 MMOL/L (ref 7–16)
APPEARANCE UR: CLEAR
AST SERPL-CCNC: 26 U/L (ref 12–45)
BARBITURATES UR QL SCN: NEGATIVE
BASOPHILS # BLD AUTO: 0.5 % (ref 0–1.8)
BASOPHILS # BLD: 0.06 K/UL (ref 0–0.12)
BENZODIAZ UR QL SCN: NEGATIVE
BILIRUB SERPL-MCNC: 0.3 MG/DL (ref 0.1–1.5)
BILIRUB UR QL STRIP.AUTO: NEGATIVE
BUN SERPL-MCNC: 21 MG/DL (ref 8–22)
BZE UR QL SCN: NEGATIVE
CALCIUM SERPL-MCNC: 9.3 MG/DL (ref 8.5–10.5)
CANNABINOIDS UR QL SCN: NEGATIVE
CHLORIDE SERPL-SCNC: 103 MMOL/L (ref 96–112)
CO2 SERPL-SCNC: 23 MMOL/L (ref 20–33)
COLOR UR: YELLOW
CREAT SERPL-MCNC: 1.02 MG/DL (ref 0.5–1.4)
EKG IMPRESSION: NORMAL
EOSINOPHIL # BLD AUTO: 0.07 K/UL (ref 0–0.51)
EOSINOPHIL NFR BLD: 0.6 % (ref 0–6.9)
ERYTHROCYTE [DISTWIDTH] IN BLOOD BY AUTOMATED COUNT: 44.6 FL (ref 35.9–50)
ETHANOL BLD-MCNC: 193.6 MG/DL
GFR SERPLBLD CREATININE-BSD FMLA CKD-EPI: 80 ML/MIN/1.73 M 2
GLOBULIN SER CALC-MCNC: 2.9 G/DL (ref 1.9–3.5)
GLUCOSE SERPL-MCNC: 105 MG/DL (ref 65–99)
GLUCOSE UR STRIP.AUTO-MCNC: NEGATIVE MG/DL
HCT VFR BLD AUTO: 48.2 % (ref 42–52)
HGB BLD-MCNC: 16.3 G/DL (ref 14–18)
IMM GRANULOCYTES # BLD AUTO: 0.05 K/UL (ref 0–0.11)
IMM GRANULOCYTES NFR BLD AUTO: 0.4 % (ref 0–0.9)
KETONES UR STRIP.AUTO-MCNC: NEGATIVE MG/DL
LEUKOCYTE ESTERASE UR QL STRIP.AUTO: NEGATIVE
LYMPHOCYTES # BLD AUTO: 1.75 K/UL (ref 1–4.8)
LYMPHOCYTES NFR BLD: 13.9 % (ref 22–41)
MCH RBC QN AUTO: 32 PG (ref 27–33)
MCHC RBC AUTO-ENTMCNC: 33.8 G/DL (ref 33.7–35.3)
MCV RBC AUTO: 94.5 FL (ref 81.4–97.8)
METHADONE UR QL SCN: NEGATIVE
MICRO URNS: NORMAL
MONOCYTES # BLD AUTO: 0.65 K/UL (ref 0–0.85)
MONOCYTES NFR BLD AUTO: 5.2 % (ref 0–13.4)
NEUTROPHILS # BLD AUTO: 9.98 K/UL (ref 1.82–7.42)
NEUTROPHILS NFR BLD: 79.4 % (ref 44–72)
NITRITE UR QL STRIP.AUTO: NEGATIVE
NRBC # BLD AUTO: 0 K/UL
NRBC BLD-RTO: 0 /100 WBC
OPIATES UR QL SCN: NEGATIVE
OXYCODONE UR QL SCN: NEGATIVE
PCP UR QL SCN: NEGATIVE
PH UR STRIP.AUTO: 5.5 [PH] (ref 5–8)
PLATELET # BLD AUTO: 269 K/UL (ref 164–446)
PMV BLD AUTO: 9.5 FL (ref 9–12.9)
POTASSIUM SERPL-SCNC: 4.5 MMOL/L (ref 3.6–5.5)
PROPOXYPH UR QL SCN: NEGATIVE
PROT SERPL-MCNC: 7.5 G/DL (ref 6–8.2)
PROT UR QL STRIP: NEGATIVE MG/DL
RBC # BLD AUTO: 5.1 M/UL (ref 4.7–6.1)
RBC UR QL AUTO: NEGATIVE
SODIUM SERPL-SCNC: 140 MMOL/L (ref 135–145)
SP GR UR STRIP.AUTO: 1.01
UROBILINOGEN UR STRIP.AUTO-MCNC: 0.2 MG/DL
WBC # BLD AUTO: 12.6 K/UL (ref 4.8–10.8)

## 2022-06-18 PROCEDURE — 80053 COMPREHEN METABOLIC PANEL: CPT

## 2022-06-18 PROCEDURE — 70450 CT HEAD/BRAIN W/O DYE: CPT

## 2022-06-18 PROCEDURE — 36415 COLL VENOUS BLD VENIPUNCTURE: CPT

## 2022-06-18 PROCEDURE — 304217 HCHG IRRIGATION SYSTEM

## 2022-06-18 PROCEDURE — 82077 ASSAY SPEC XCP UR&BREATH IA: CPT

## 2022-06-18 PROCEDURE — 81003 URINALYSIS AUTO W/O SCOPE: CPT

## 2022-06-18 PROCEDURE — 700105 HCHG RX REV CODE 258: Performed by: EMERGENCY MEDICINE

## 2022-06-18 PROCEDURE — 85025 COMPLETE CBC W/AUTO DIFF WBC: CPT

## 2022-06-18 PROCEDURE — 700101 HCHG RX REV CODE 250: Performed by: EMERGENCY MEDICINE

## 2022-06-18 PROCEDURE — 99285 EMERGENCY DEPT VISIT HI MDM: CPT

## 2022-06-18 PROCEDURE — 304999 HCHG REPAIR-SIMPLE/INTERMED LEVEL 1

## 2022-06-18 PROCEDURE — 303747 HCHG EXTRA SUTURE

## 2022-06-18 PROCEDURE — 93005 ELECTROCARDIOGRAM TRACING: CPT | Mod: XE | Performed by: EMERGENCY MEDICINE

## 2022-06-18 PROCEDURE — 80307 DRUG TEST PRSMV CHEM ANLYZR: CPT

## 2022-06-18 RX ORDER — SODIUM CHLORIDE 9 MG/ML
1000 INJECTION, SOLUTION INTRAVENOUS ONCE
Status: COMPLETED | OUTPATIENT
Start: 2022-06-18 | End: 2022-06-18

## 2022-06-18 RX ORDER — LIDOCAINE HYDROCHLORIDE AND EPINEPHRINE 10; 10 MG/ML; UG/ML
20 INJECTION, SOLUTION INFILTRATION; PERINEURAL ONCE
Status: COMPLETED | OUTPATIENT
Start: 2022-06-18 | End: 2022-06-18

## 2022-06-18 RX ORDER — LORAZEPAM 2 MG/ML
1 INJECTION INTRAMUSCULAR ONCE
Status: DISCONTINUED | OUTPATIENT
Start: 2022-06-18 | End: 2022-06-19 | Stop reason: HOSPADM

## 2022-06-18 RX ORDER — SODIUM CHLORIDE, SODIUM LACTATE, POTASSIUM CHLORIDE, CALCIUM CHLORIDE 600; 310; 30; 20 MG/100ML; MG/100ML; MG/100ML; MG/100ML
INJECTION, SOLUTION INTRAVENOUS CONTINUOUS
Status: DISCONTINUED | OUTPATIENT
Start: 2022-06-18 | End: 2022-06-19 | Stop reason: HOSPADM

## 2022-06-18 RX ADMIN — SODIUM CHLORIDE, POTASSIUM CHLORIDE, SODIUM LACTATE AND CALCIUM CHLORIDE: 600; 310; 30; 20 INJECTION, SOLUTION INTRAVENOUS at 19:38

## 2022-06-18 RX ADMIN — SODIUM CHLORIDE 1000 ML: 9 INJECTION, SOLUTION INTRAVENOUS at 18:24

## 2022-06-18 RX ADMIN — LIDOCAINE HYDROCHLORIDE,EPINEPHRINE BITARTRATE 20 ML: 10; .01 INJECTION, SOLUTION INFILTRATION; PERINEURAL at 18:15

## 2022-06-19 NOTE — ED NOTES
Pt has discharge orders. Pt educated on discharge instructions.  Pt verbalizes understanding.  Pt encouraged to follow up with PCP PIV removed. Pt ambulatory to lobby. Pt going home with wife.

## 2022-06-19 NOTE — ED NOTES
Continuous fluids initiated and PO fluids provided to pt per MD orders. Pt and wife educated on need for urine sample.

## 2022-06-19 NOTE — DISCHARGE INSTRUCTIONS
Keep your wounds very clean with gentle cleansing with soap and water daily.  If any of the areas become red or swollen or has pus or discharge or problems healing return at once for recheck.  The sutures in your forehead laceration are absorbable and should fall out within 7 days, if they have not return here for suture removal.  Avoid alcohol in the future.

## 2022-06-19 NOTE — ED PROVIDER NOTES
ED Provider Note    CHIEF COMPLAINT  No chief complaint on file.      HPI  Kris Mcdonald is a 66 y.o. male who presents to the emergency department brought in by his spouse with laceration to the forehead and abrasions after a ground-level fall.  The patient went to the beer fast with his son and had several beers, he was apparently sitting on a Trigg and then was observed to fall forward off of the Trigg striking his head on the ground and then was found to be confused for a brief period after that.  He had an episode of vomiting prior to arrival.  His wife found him with a laceration to the forehead and multiple abrasions on his legs and brought him to the emergency department for evaluation.  The patient does not recall exactly why he fell off of the Trigg.    REVIEW OF SYSTEMS no neck pain no chest pain or difficulty breathing no abdominal pain.  The patient does have abrasions to his extremities but he says he has no bony pain.  Until this episode he said he was completely well he has not recently had any symptoms of illness.  The patient says that his tetanus booster is up-to-date.  All other systems negative    PAST MEDICAL HISTORY  Past Medical History:   Diagnosis Date   • Hypercholesteremia    • Hypertension    • Impaired fasting glucose 7/5/2018   • Migraine    • Sleep apnea     uses cpap       FAMILY HISTORY  Family History   Problem Relation Age of Onset   • Heart Disease Mother    • Diabetes Mother    • Heart Disease Father         pacemaker       SOCIAL HISTORY  Social History     Socioeconomic History   • Marital status:    Tobacco Use   • Smoking status: Never Smoker   • Smokeless tobacco: Never Used   Vaping Use   • Vaping Use: Never used   Substance and Sexual Activity   • Alcohol use: Yes     Alcohol/week: 1.8 oz     Types: 3 Cans of beer per week     Comment: 6 pack a week    • Drug use: No   • Sexual activity: Yes     Partners: Female   Social History Narrative    [employer]          SURGICAL HISTORY  Past Surgical History:   Procedure Laterality Date   • SHOULDER ARTHROSCOPY W/ BICIPITAL TENODESIS REPAIR  10/1/2012    Performed by Efrain Zamarripa M.D. at SURGERY [facility]   • ROTATOR CUFF REPAIR  10/1/2012    Performed by Efrain Zamarripa M.D. at SURGERY [facility]   • SHOULDER DECOMPRESSION  10/1/2012    Performed by Efrain Zamarripa M.D. at SURGERY [facility]       CURRENT MEDICATIONS  Home Medications    **Home medications have not yet been reviewed for this encounter**         ALLERGIES  No Known Allergies    PHYSICAL EXAM  VITAL SIGNS: /70   Pulse 76   Temp 36.8 °C (98.2 °F) (Temporal)   Resp 14   SpO2 95%    Oxygen saturation is interpreted as adequate  Constitutional: The patient is awake and verbal he does not appear toxic or distressed  HENT: There is a stellate laceration to the forehead through the skin into the subcutaneous fat that will require closure total linear length is 4 cm  Eyes: Pupils round extraocular motion present  Neck: Trachea midline no JVD C-spine nontender  Cardiovascular: Regular tachycardia at the time of arrival  Lungs: Clear and equal bilaterally with no apparent difficulty breathing  Abdomen/Back: Soft nontender nondistended no rebound guarding or peritoneal findings  Skin: Warm and dry  Musculoskeletal: There are multiple abrasions over the shins of both legs and a slight abrasion over the right elbow there is no bony tenderness or deformity and good range of motion throughout  Neurologic: Awake lucid verbal moving all extremities without difficulty the patient is amnestic to the actual fall.    Laboratory  CBC showed a slight elevated white blood cell count of 12.6 hemoglobin is adequate at 16.3 basic metabolic panel is unremarkable blood alcohol level is elevated at 193.6.  Urinalysis is negative for nitrite leukocyte Estrace and blood.  Urine toxicology screen is completely negative.    EKG  interpretation  Twelve-lead EKG showed sinus rhythm 71 bpm there is a left axis deviation there is no pathologic ST elevation depression or ectopy    Radiology  CT-HEAD W/O   Final Result      1.  Right frontal scalp hematoma/laceration without evidence of skull fracture or intracranial hemorrhage.               PROCEDURES  The area about the wound was locally infiltrated with lidocaine with epinephrine too achieve adequate anesthesia. The wound was then irrigated with copious amounts of normal saline through a jet irrigation system. The wound was inspected and clean and superficial. Using a sterile field and sterile technique, 8, 5-0 rapidly absorbing gut simple interrupted, sutures were placed to close the wound. Suture and wound care instructions were discussed with the patient. Bacitracin and a bandage were placed.      MEDICAL DECISION MAKING and DISPOSITION  In the emergency department an IV was established and the patient was given intravenous fluids for tachycardia.  The patient's vital signs have completely normalized.  The patient's wounds have been cleansed and closed the abrasions have been cleansed and treated with antibiotic ointment.  I have discussed wound care with the patient and his wife and he is to wash his abrasions gently with soap and water daily until they have healed.  The sutures are absorbable and should fall out within 7 days and if they have not he is to return here for suture removal.  If any of the areas become red or swollen or have pus or discharge or problems healing he is to return here for recheck and if he notices any new or worsening symptoms he is to return for reevaluation.  I have encouraged the patient to avoid alcohol.  He is instructed to follow-up with his primary care doctor during the week for recheck and wound recheck.    IMPRESSION  1.  Alcohol intoxication  2.  Concussion following ground-level fall  3.  4 cm stellate laceration to the forehead sutured in the  emergency department  4.  Multiple abrasions to the extremities as described above           Electronically signed by: Min Cardona M.D., 6/18/2022 10:42 PM

## 2022-06-19 NOTE — ED NOTES
Pt brought in by wife, noted to be heavily intoxicated. Pt has multiple wounds all over of his body and a gash to his forehead. Pt AOX3. Pt hypotensive in triage with systolic below 90 x2.     Charge aware, pt to green 23 per charge.

## 2022-06-19 NOTE — ED NOTES
All of Pts wounds have been irrigated/cleaned, and bandages apply. Pt was educated on wound care and understood all instructions with no questions at this time.

## 2022-07-13 RX ORDER — BUTALBITAL, ACETAMINOPHEN, CAFFEINE AND CODEINE PHOSPHATE 50; 325; 40; 30 MG/1; MG/1; MG/1; MG/1
1 CAPSULE ORAL EVERY 4 HOURS PRN
Qty: 90 CAPSULE | Status: CANCELLED | OUTPATIENT
Start: 2022-07-13

## 2022-07-23 NOTE — PROGRESS NOTES
Labs are fine. Continue the same.   Subjective:     CC: Medication refill and right ear pain    HPI:   Juan presents today with the following:    Right ear pain  Acute medical problem. The right ear pain started 3 weeks ago. The pain is intermittent. The pain is described are irritated. The pain is not improving. Reports history of swimmers ear for 60 years. He has been swimming recently. Denies fever, chills, sore throat, nasal congestion, chest pain, or shortness of breath.       Migraine  Chronic medical problem. He has been taking Fiorinal with codeine. His last migraine was 1 month ago. His last refill was 11/1/2020 for #90 tablets. He still has medication left. He is due for updated controlled substance treatment agreement.     Essential hypertension  Chronic medical problem. He is taking metoprolol  mg daily. He is not checking his blood pressure at home. His blood pressure today is 108/86.  He is not having any chest pain, shortness of breath, dizziness, blurry vision, or headache.    Hypercholesteremia  Chronic medical problem.  He is taking atorvastatin 40 mg every evening.  He is tolerating medication.  Denies myalgias.  He would like a medication refill today.  He is due for updated labs.    Muscle spasms of both lower extremities  Chronic medical problem. He is taking cyclobenzaprine 10 mg BID PRN.  He continues to take as needed.  He does need a medication refill today.    Prediabetes  Chronic medical problem.  He is due for updated labs.  Last lab results:  Results for JUAN JOLLY (MRN [id number]) as of 8/26/2021 09:33   Ref. Range 3/7/2019 07:21   Glycohemoglobin Latest Ref Range: 0.0 - 5.6 % 5.8 (H)   Estim. Avg Glu Latest Units: mg/dL 120   Fasting Status Unknown Fasting       Past Medical History:   Diagnosis Date   • Hypercholesteremia    • Hypertension    • Impaired fasting glucose 7/5/2018   • Migraine    • Sleep apnea     uses cpap       Social History     Tobacco Use   • Smoking status: Never Smoker   • Smokeless  "tobacco: Never Used   Vaping Use   • Vaping Use: Never used   Substance Use Topics   • Alcohol use: Yes     Alcohol/week: 1.8 oz     Types: 3 Cans of beer per week     Comment: 6 pack a week    • Drug use: No       Current Outpatient Medications Ordered in Epic   Medication Sig Dispense Refill   • butalbital-acetaminophen-caffeine-codeine (FIORICET W/CODEINE) -88-30 MG per capsule Take 1 Capsule by mouth every four hours as needed for Headache.     • atorvastatin (LIPITOR) 40 MG Tab Take 1 Tablet by mouth every day. 90 Tablet 3   • cyclobenzaprine (FLEXERIL) 10 mg Tab Take 1 Tablet by mouth 2 times a day as needed for Muscle Spasms. 90 Tablet 1   • metoprolol SR (TOPROL XL) 100 MG TABLET SR 24 HR TAKE 1 TABLET DAILY 90 Tablet 3   • acetic acid-hydrocortisone (VOSOL-HC) 1-2 % Solution Administer 2 Drops into the right ear 2 times a day. 10 mL 0   • aspirin EC (ECOTRIN) 81 MG Tablet Delayed Response Take 81 mg by mouth every day.     • Multiple Vitamins-Minerals (MULTIVITAMIN ADULT PO) Take  by mouth.       No current Epic-ordered facility-administered medications on file.       Allergies:  Patient has no known allergies.    Health Maintenance: Due for zoster vaccine    ROS:  Per HPI    Objective:     Vital signs reviewed   Exam:  /86 (BP Location: Left arm, Patient Position: Sitting, BP Cuff Size: Large adult)   Pulse 75   Temp 36.9 °C (98.5 °F) (Temporal)   Resp 16   Ht 1.86 m (6' 1.23\")   Wt 107 kg (235 lb)   SpO2 97%   BMI 30.81 kg/m²  Body mass index is 30.81 kg/m².      General: Normal appearing. No distress.  HENT: Normocephalic. Ears normal shape and contour, left ear canal is clear and tympanic membrane is benign.  Right ear canal with redness and swelling, tympanic membrane is benign.    Pulmonary: Clear to ausculation.  Normal effort. No rales, ronchi, or wheezing.  Cardiovascular: Regular rate and rhythm without murmur.   Lymph: No cervical or supraclavicular lymph nodes are " palpable  Skin: Warm and dry.  No obvious lesions.  Musculoskeletal: Normal gait. No extremity cyanosis, clubbing, or edema.  Psych: Normal mood and affect. Alert and oriented x3. Judgment and insight is normal      Assessment & Plan:     66 y.o. male with the following -     1. Chronic swimmer's ear of right side  Acute uncomplicated problem.  This is new problem to me.  We will start him on acetic acid-hydrocortisone for the right ear canal swelling and redness.  Discussed keeping the area clean and dry.  Red flags discussed.  - acetic acid-hydrocortisone (VOSOL-HC) 1-2 % Solution; Administer 2 Drops into the right ear 2 times a day.  Dispense: 10 mL; Refill: 0    2. Essential hypertension  Chronic stable problem.  Blood pressure is at goal today.  He will continue his metoprolol.  Medication refilled today.  We discussed that he should monitor his blood pressure at home and encouraged him to start monitoring his blood pressure.  He verbalized understanding. He is due for updated labs.  Orders placed.  - CBC WITH DIFFERENTIAL; Future  - Comp Metabolic Panel; Future  - metoprolol SR (TOPROL XL) 100 MG TABLET SR 24 HR; TAKE 1 TABLET DAILY  Dispense: 90 Tablet; Refill: 3    3. Hypercholesteremia  Chronic stable problem.  He will continue with his atorvastatin.  Medication refilled today.  He is due for updated labs.  Orders placed.  - Lipid Profile; Future  - atorvastatin (LIPITOR) 40 MG Tab; Take 1 Tablet by mouth every day.  Dispense: 90 Tablet; Refill: 3    4. Intractable migraine without status migrainosus, unspecified migraine type  Chronic stable problem.  He will continue with his Fiorinal with codeine.  He does not need a medication refill today.  His controlled substance treatment agreement was updated today.  PDMP reviewed today.  - Controlled Substance Treatment Agreement    5. Prediabetes  Chronic stable problem.  He is due for updated labs.  Encourage diet and lifestyle modifications.  - HEMOGLOBIN A1C;  Future    6. Muscle spasms of both lower extremities  Chronic stable problem.  He will continue the cyclobenzaprine.  Medication refilled today.  No acute complaints today.  - cyclobenzaprine (FLEXERIL) 10 mg Tab; Take 1 Tablet by mouth 2 times a day as needed for Muscle Spasms.  Dispense: 90 Tablet; Refill: 1    7. Need for vaccination  Chronic stable problem.  He is due for vaccine.  Patient is agreement.  Orders placed. I have placed the below orders and discussed them with an approved delegating provider.  The MA is performing the below orders under the direction of Dr. Cleaning.  - Shingles Vaccine (Shingrix)        Return in about 1 year (around 8/26/2022).    Please note that this dictation was created using voice recognition software. I have made every reasonable attempt to correct obvious errors, but I expect that there are errors of grammar and possibly content that I did not discover before finalizing the note.

## 2022-10-19 ENCOUNTER — HOSPITAL ENCOUNTER (OUTPATIENT)
Dept: LAB | Facility: MEDICAL CENTER | Age: 67
End: 2022-10-19
Attending: PHYSICIAN ASSISTANT
Payer: MEDICARE

## 2022-10-19 LAB
ALBUMIN SERPL BCP-MCNC: 5.1 G/DL (ref 3.2–4.9)
ALP SERPL-CCNC: 76 U/L (ref 30–99)
ALT SERPL-CCNC: 35 U/L (ref 2–50)
AST SERPL-CCNC: 30 U/L (ref 12–45)
BILIRUB CONJ SERPL-MCNC: <0.2 MG/DL (ref 0.1–0.5)
BILIRUB INDIRECT SERPL-MCNC: ABNORMAL MG/DL (ref 0–1)
BILIRUB SERPL-MCNC: 0.7 MG/DL (ref 0.1–1.5)
CHOLEST SERPL-MCNC: 145 MG/DL (ref 100–199)
EST. AVERAGE GLUCOSE BLD GHB EST-MCNC: 114 MG/DL
FASTING STATUS PATIENT QL REPORTED: NORMAL
HBA1C MFR BLD: 5.6 % (ref 4–5.6)
HDLC SERPL-MCNC: 35 MG/DL
LDLC SERPL CALC-MCNC: 88 MG/DL
PROT SERPL-MCNC: 7.8 G/DL (ref 6–8.2)
TRIGL SERPL-MCNC: 112 MG/DL (ref 0–149)

## 2022-10-19 PROCEDURE — 80076 HEPATIC FUNCTION PANEL: CPT

## 2022-10-19 PROCEDURE — 83036 HEMOGLOBIN GLYCOSYLATED A1C: CPT | Mod: GA

## 2022-10-19 PROCEDURE — 36415 COLL VENOUS BLD VENIPUNCTURE: CPT

## 2022-10-19 PROCEDURE — 80061 LIPID PANEL: CPT

## 2022-11-04 ENCOUNTER — PATIENT MESSAGE (OUTPATIENT)
Dept: HEALTH INFORMATION MANAGEMENT | Facility: OTHER | Age: 67
End: 2022-11-04

## 2025-02-18 ENCOUNTER — HOSPITAL ENCOUNTER (OUTPATIENT)
Dept: LAB | Facility: MEDICAL CENTER | Age: 70
End: 2025-02-18
Attending: NURSE PRACTITIONER
Payer: MEDICARE

## 2025-02-18 LAB
ALBUMIN SERPL BCP-MCNC: 3.9 G/DL (ref 3.2–4.9)
ALBUMIN/GLOB SERPL: 1.4 G/DL
ALP SERPL-CCNC: 48 U/L (ref 30–99)
ALT SERPL-CCNC: 46 U/L (ref 2–50)
ANION GAP SERPL CALC-SCNC: 12 MMOL/L (ref 7–16)
AST SERPL-CCNC: 37 U/L (ref 12–45)
BILIRUB SERPL-MCNC: 0.4 MG/DL (ref 0.1–1.5)
BUN SERPL-MCNC: 16 MG/DL (ref 8–22)
CALCIUM ALBUM COR SERPL-MCNC: 9 MG/DL (ref 8.5–10.5)
CALCIUM SERPL-MCNC: 8.9 MG/DL (ref 8.5–10.5)
CHLORIDE SERPL-SCNC: 106 MMOL/L (ref 96–112)
CHOLEST SERPL-MCNC: 120 MG/DL (ref 100–199)
CO2 SERPL-SCNC: 23 MMOL/L (ref 20–33)
CREAT SERPL-MCNC: 0.99 MG/DL (ref 0.5–1.4)
ERYTHROCYTE [DISTWIDTH] IN BLOOD BY AUTOMATED COUNT: 44.9 FL (ref 35.9–50)
EST. AVERAGE GLUCOSE BLD GHB EST-MCNC: 128 MG/DL
FASTING STATUS PATIENT QL REPORTED: NORMAL
GFR SERPLBLD CREATININE-BSD FMLA CKD-EPI: 82 ML/MIN/1.73 M 2
GLOBULIN SER CALC-MCNC: 2.8 G/DL (ref 1.9–3.5)
GLUCOSE SERPL-MCNC: 94 MG/DL (ref 65–99)
HBA1C MFR BLD: 6.1 % (ref 4–5.6)
HCT VFR BLD AUTO: 49.1 % (ref 42–52)
HDLC SERPL-MCNC: 35 MG/DL
HGB BLD-MCNC: 16.1 G/DL (ref 14–18)
LDLC SERPL CALC-MCNC: 61 MG/DL
MCH RBC QN AUTO: 31.8 PG (ref 27–33)
MCHC RBC AUTO-ENTMCNC: 32.8 G/DL (ref 32.3–36.5)
MCV RBC AUTO: 96.8 FL (ref 81.4–97.8)
PLATELET # BLD AUTO: 221 K/UL (ref 164–446)
PMV BLD AUTO: 9.6 FL (ref 9–12.9)
POTASSIUM SERPL-SCNC: 3.9 MMOL/L (ref 3.6–5.5)
PROT SERPL-MCNC: 6.7 G/DL (ref 6–8.2)
RBC # BLD AUTO: 5.07 M/UL (ref 4.7–6.1)
SODIUM SERPL-SCNC: 141 MMOL/L (ref 135–145)
T4 FREE SERPL-MCNC: 1.61 NG/DL (ref 0.93–1.7)
TRIGL SERPL-MCNC: 121 MG/DL (ref 0–149)
TSH SERPL-ACNC: 1.22 UIU/ML (ref 0.35–5.5)
WBC # BLD AUTO: 8.2 K/UL (ref 4.8–10.8)

## 2025-02-18 PROCEDURE — 84443 ASSAY THYROID STIM HORMONE: CPT

## 2025-02-18 PROCEDURE — 80061 LIPID PANEL: CPT

## 2025-02-18 PROCEDURE — 36415 COLL VENOUS BLD VENIPUNCTURE: CPT

## 2025-02-18 PROCEDURE — 80053 COMPREHEN METABOLIC PANEL: CPT

## 2025-02-18 PROCEDURE — 84439 ASSAY OF FREE THYROXINE: CPT

## 2025-02-18 PROCEDURE — 83036 HEMOGLOBIN GLYCOSYLATED A1C: CPT | Mod: GA

## 2025-02-18 PROCEDURE — 85027 COMPLETE CBC AUTOMATED: CPT

## 2025-04-28 PROBLEM — M79.642 LEFT HAND PAIN: Status: ACTIVE | Noted: 2025-04-28

## 2025-04-28 PROBLEM — M65.332 TRIGGER MIDDLE FINGER OF LEFT HAND: Status: ACTIVE | Noted: 2025-04-28

## 2025-04-28 PROBLEM — M65.312 TRIGGER THUMB, LEFT THUMB: Status: ACTIVE | Noted: 2025-04-28

## 2025-09-20 ENCOUNTER — APPOINTMENT (OUTPATIENT)
Dept: LAB | Facility: MEDICAL CENTER | Age: 70
End: 2025-09-20
Payer: MEDICARE